# Patient Record
Sex: FEMALE | Race: WHITE
[De-identification: names, ages, dates, MRNs, and addresses within clinical notes are randomized per-mention and may not be internally consistent; named-entity substitution may affect disease eponyms.]

---

## 2022-07-05 NOTE — NUR
CALLED [facility] FOR PANEL CALL, DR. MIX PAGED AND ON PHONE WITH DR. WEI.

## 2022-07-06 NOTE — NUR
Patient is alert to self, speaks little English, able to answer simple questions. Is COVID 
positive, no SOB or coughing noted. S/P dialysis this early morning, no adverse reactions 
noted. AV shunt to left upper arm. Peripheral IV to right FA, patent and intact. Patient 
needs assistance with feeding, poor appetite noted. All needs attended, call light within 
reach.

## 2022-07-06 NOTE — NUR
Spoke to supervisor Lorena to follow up regarding dialysis. Per Lorena pt 
has to be inpatient to have dialysis done. Informed Dr. Mccord and will admit 
patient. Epic panel call placed, spoke to Mikki, she stated she will get a hold 
of Dr. Hill for admitting.

## 2022-07-06 NOTE — NUR
Pt. admitted to [facility] surgical unit Room 318, under care of Dr. Hill. Belongs 
List completed.

## 2022-07-06 NOTE — NUR
Admitted patient in Med Surg  floor under the care of Dr Hill, Patient alert 2/3 unclear if 
she speak English cause does not response to verbal stimuli, Patient has dialysis shunt on 
left upper arm, no bleeding noted, dialysis staff will do dialysis asap, patient BP 
elevated, MD aware. Patient positive for Covid 19, on droplet caution, on isolation, no s/s 
of sob no chest pain, no s/s of pain, sat wnl in 96% room air. Patient has right and left 
groin redness, patient has wet diaper, cont to monitor.

## 2022-07-06 NOTE — NUR
Telephone call from Dialysis RN Yogi and stated that pt. need to be inpatient 
to have dialysis done. Informed Supervisor Lorena and spoke to MARLON Calix.

## 2022-07-07 NOTE — NUR
Patient received care well throughout shift with no signs of distress or discomfort.  
Patient tolerating feedings, but when medication is crushed.  Will endorse to PM nurse for 
the need of swallow evaluation.  IV site patent and intact.  Patient originally scheduled to 
have dialysis performed today but will have dialysis performed tomorrow.  Bed left in lowest 
position with call light within reach.  Comfort measures provided.  Will endorse information 
to PM nurse.

## 2022-07-07 NOTE — NUR
Awake alert and oriented x1-2 Calm and cooperative. VSS. Needs attended.

COVID(+) All due meds given without difficulty. Incontinent of both bowel and 

bladder. Kept clean and dry. Patient on dialysis Tues-Thurs-Sat Left arm AV 

shunt intact. Oligur ic. Incontinent of bowel and bladder. BM noted this shift. 

Fall precautions maintained.Siderails up for safety.                                         
                                                                                            
.

## 2022-11-17 ENCOUNTER — HOSPITAL ENCOUNTER (INPATIENT)
Dept: HOSPITAL 12 - ER | Age: 87
LOS: 8 days | Discharge: NURSING, REHAB OR LONG-TERM CARE | DRG: 871 | End: 2022-11-25
Admitting: INTERNAL MEDICINE
Payer: MEDICARE

## 2022-11-17 VITALS — BODY MASS INDEX: 19.31 KG/M2 | WEIGHT: 109 LBS | HEIGHT: 63 IN

## 2022-11-17 DIAGNOSIS — M24.562: ICD-10-CM

## 2022-11-17 DIAGNOSIS — M24.561: ICD-10-CM

## 2022-11-17 DIAGNOSIS — Z20.822: ICD-10-CM

## 2022-11-17 DIAGNOSIS — R62.7: ICD-10-CM

## 2022-11-17 DIAGNOSIS — E87.20: ICD-10-CM

## 2022-11-17 DIAGNOSIS — L89.896: ICD-10-CM

## 2022-11-17 DIAGNOSIS — E87.6: ICD-10-CM

## 2022-11-17 DIAGNOSIS — E11.22: ICD-10-CM

## 2022-11-17 DIAGNOSIS — E11.65: ICD-10-CM

## 2022-11-17 DIAGNOSIS — D68.59: ICD-10-CM

## 2022-11-17 DIAGNOSIS — J44.0: ICD-10-CM

## 2022-11-17 DIAGNOSIS — I35.0: ICD-10-CM

## 2022-11-17 DIAGNOSIS — Z66: ICD-10-CM

## 2022-11-17 DIAGNOSIS — B96.20: ICD-10-CM

## 2022-11-17 DIAGNOSIS — Z99.2: ICD-10-CM

## 2022-11-17 DIAGNOSIS — E43: ICD-10-CM

## 2022-11-17 DIAGNOSIS — N39.0: ICD-10-CM

## 2022-11-17 DIAGNOSIS — Z79.4: ICD-10-CM

## 2022-11-17 DIAGNOSIS — Z16.12: ICD-10-CM

## 2022-11-17 DIAGNOSIS — J69.0: ICD-10-CM

## 2022-11-17 DIAGNOSIS — N25.0: ICD-10-CM

## 2022-11-17 DIAGNOSIS — Z79.82: ICD-10-CM

## 2022-11-17 DIAGNOSIS — N18.6: ICD-10-CM

## 2022-11-17 DIAGNOSIS — Z22.322: ICD-10-CM

## 2022-11-17 DIAGNOSIS — G92.8: ICD-10-CM

## 2022-11-17 DIAGNOSIS — A41.9: Primary | ICD-10-CM

## 2022-11-17 DIAGNOSIS — I12.0: ICD-10-CM

## 2022-11-17 DIAGNOSIS — R65.20: ICD-10-CM

## 2022-11-17 DIAGNOSIS — E78.5: ICD-10-CM

## 2022-11-17 DIAGNOSIS — F03.90: ICD-10-CM

## 2022-11-17 DIAGNOSIS — J96.21: ICD-10-CM

## 2022-11-17 DIAGNOSIS — I45.10: ICD-10-CM

## 2022-11-17 DIAGNOSIS — Z74.09: ICD-10-CM

## 2022-11-17 LAB
ALP SERPL-CCNC: 178 U/L (ref 50–136)
ALT SERPL W/O P-5'-P-CCNC: 48 U/L (ref 14–59)
AST SERPL-CCNC: 80 U/L (ref 15–37)
BASE EXCESS BLDA CALC-SCNC: 1.3 MMOL/L
BASE EXCESS BLDA CALC-SCNC: 5.9 MMOL/L
BILIRUB DIRECT SERPL-MCNC: 0.2 MG/DL (ref 0–0.2)
BILIRUB SERPL-MCNC: 0.5 MG/DL (ref 0.2–1)
BUN SERPL-MCNC: 38 MG/DL (ref 7–18)
CHLORIDE SERPL-SCNC: 96 MMOL/L (ref 98–107)
CO2 SERPL-SCNC: 29 MMOL/L (ref 21–32)
CREAT SERPL-MCNC: 1.5 MG/DL (ref 0.6–1.3)
GLUCOSE SERPL-MCNC: 389 MG/DL (ref 74–106)
HCO3 BLDA-SCNC: 24.5 MMOL/L
HCO3 BLDA-SCNC: 29.2 MMOL/L
HCT VFR BLD AUTO: 39.4 % (ref 31.2–41.9)
HGB BLDA OXIMETRY-MCNC: 12.8 G/DL (ref 12–16)
HGB BLDA OXIMETRY-MCNC: 13.3 G/DL (ref 12–16)
INHALED O2 CONCENTRATION: 100 %
INHALED O2 CONCENTRATION: 100 %
INHALED O2 FLOW RATE: 15 L/MIN (ref 0–30)
INHALED O2 FLOW RATE: 40 L/MIN (ref 0–30)
MCH RBC QN AUTO: 29.3 UUG (ref 24.7–32.8)
MCV RBC AUTO: 93.3 FL (ref 75.5–95.3)
PCO2 TEMP ADJ BLDA: 34.3 MMHG (ref 35–45)
PCO2 TEMP ADJ BLDA: 37.5 MMHG (ref 35–45)
PH TEMP ADJ BLDA: 7.47 [PH] (ref 7.35–7.45)
PH TEMP ADJ BLDA: 7.51 [PH] (ref 7.35–7.45)
PLATELET # BLD AUTO: 357 K/UL (ref 179–408)
PO2 TEMP ADJ BLDA: 280.3 MMHG (ref 75–100)
PO2 TEMP ADJ BLDA: 50.8 MMHG (ref 75–100)
POTASSIUM SERPL-SCNC: 3 MMOL/L (ref 3.5–5.1)
SPECIMEN DRAWN FROM PATIENT: (no result)
SPECIMEN DRAWN FROM PATIENT: (no result)
VENTILATION MODE VENT: (no result)
WS STN SPEC: 6.9 G/DL (ref 6.4–8.2)

## 2022-11-17 PROCEDURE — A6213 FOAM DRG >16<=48 SQ IN W/BDR: HCPCS

## 2022-11-17 PROCEDURE — A6209 FOAM DRSG <=16 SQ IN W/O BDR: HCPCS

## 2022-11-17 PROCEDURE — U0003 INFECTIOUS AGENT DETECTION BY NUCLEIC ACID (DNA OR RNA); SEVERE ACUTE RESPIRATORY SYNDROME CORONAVIRUS 2 (SARS-COV-2) (CORONAVIRUS DISEASE [COVID-19]), AMPLIFIED PROBE TECHNIQUE, MAKING USE OF HIGH THROUGHPUT TECHNOLOGIES AS DESCRIBED BY CMS-2020-01-R: HCPCS

## 2022-11-17 PROCEDURE — G0378 HOSPITAL OBSERVATION PER HR: HCPCS

## 2022-11-17 PROCEDURE — A4663 DIALYSIS BLOOD PRESSURE CUFF: HCPCS

## 2022-11-17 RX ADMIN — DONEPEZIL HYDROCHLORIDE SCH MG: 5 TABLET, FILM COATED ORAL at 21:00

## 2022-11-17 NOTE — NUR
It is unsafe for the patient to taken PO meds (Aricept 5 mg and Lipitor 20 mg). 
Patient may aspirate.

## 2022-11-17 NOTE — NUR
Yessy (daughter) left for the night and would like to be notified when 
patient is moved to TELE unit. 



Phone #: [phone]

## 2022-11-18 VITALS — SYSTOLIC BLOOD PRESSURE: 125 MMHG | DIASTOLIC BLOOD PRESSURE: 64 MMHG

## 2022-11-18 LAB
ALP SERPL-CCNC: 141 U/L (ref 50–136)
ALT SERPL W/O P-5'-P-CCNC: 27 U/L (ref 14–59)
AST SERPL-CCNC: 28 U/L (ref 15–37)
BILIRUB SERPL-MCNC: 0.5 MG/DL (ref 0.2–1)
BUN SERPL-MCNC: 48 MG/DL (ref 7–18)
CHLORIDE SERPL-SCNC: 100 MMOL/L (ref 98–107)
CHOLEST SERPL-MCNC: 114 MG/DL (ref ?–200)
CO2 SERPL-SCNC: 28 MMOL/L (ref 21–32)
CREAT SERPL-MCNC: 2 MG/DL (ref 0.6–1.3)
GLUCOSE SERPL-MCNC: 149 MG/DL (ref 74–106)
HCT VFR BLD AUTO: 33.4 % (ref 31.2–41.9)
HDLC SERPL-MCNC: 41 MG/DL (ref 40–60)
MAGNESIUM SERPL-MCNC: 1.9 MG/DL (ref 1.8–2.4)
MCH RBC QN AUTO: 30.1 UUG (ref 24.7–32.8)
MCV RBC AUTO: 91.4 FL (ref 75.5–95.3)
PHOSPHATE SERPL-MCNC: 1.4 MG/DL (ref 2.5–4.9)
PLATELET # BLD AUTO: 262 K/UL (ref 179–408)
POTASSIUM SERPL-SCNC: 3.7 MMOL/L (ref 3.5–5.1)
TRIGL SERPL-MCNC: 68 MG/DL (ref 30–150)
TSH SERPL DL<=0.005 MIU/L-ACNC: 0.63 MIU/ML (ref 0.36–3.74)
WS STN SPEC: 5.7 G/DL (ref 6.4–8.2)

## 2022-11-18 RX ADMIN — Medication SCH EACH: at 21:43

## 2022-11-18 RX ADMIN — SODIUM CHLORIDE PRN UNIT: 9 INJECTION, SOLUTION INTRAVENOUS at 01:07

## 2022-11-18 RX ADMIN — DONEPEZIL HYDROCHLORIDE SCH MG: 5 TABLET, FILM COATED ORAL at 21:46

## 2022-11-18 RX ADMIN — ESCITALOPRAM OXALATE SCH MG: 10 TABLET, FILM COATED ORAL at 09:00

## 2022-11-18 RX ADMIN — Medication SCH EACH: at 02:15

## 2022-11-18 RX ADMIN — PIPERACILLIN SODIUM AND TAZOBACTAM SODIUM SCH MLS/HR: .375; 3 INJECTION, POWDER, LYOPHILIZED, FOR SOLUTION INTRAVENOUS at 09:56

## 2022-11-18 RX ADMIN — DOCUSATE SODIUM SCH MG: 100 CAPSULE, LIQUID FILLED ORAL at 21:46

## 2022-11-18 RX ADMIN — ASPIRIN SCH MG: 81 TABLET, CHEWABLE ORAL at 09:00

## 2022-11-18 RX ADMIN — DOCUSATE SODIUM SCH MG: 100 CAPSULE, LIQUID FILLED ORAL at 09:00

## 2022-11-18 RX ADMIN — PIPERACILLIN SODIUM AND TAZOBACTAM SODIUM SCH MLS/HR: .375; 3 INJECTION, POWDER, LYOPHILIZED, FOR SOLUTION INTRAVENOUS at 21:48

## 2022-11-18 RX ADMIN — Medication SCH EACH: at 12:00

## 2022-11-18 RX ADMIN — SODIUM CHLORIDE PRN UNIT: 9 INJECTION, SOLUTION INTRAVENOUS at 21:44

## 2022-11-18 RX ADMIN — PANTOPRAZOLE SODIUM SCH MG: 40 TABLET, DELAYED RELEASE ORAL at 07:00

## 2022-11-18 RX ADMIN — SODIUM CHLORIDE PRN UNIT: 9 INJECTION, SOLUTION INTRAVENOUS at 02:19

## 2022-11-18 RX ADMIN — Medication SCH EACH: at 16:44

## 2022-11-18 RX ADMIN — Medication SCH EACH: at 00:57

## 2022-11-18 NOTE — NUR
Funmi cleaning perform. Diaper fully saturated with brown diarrhea. Bright red 
blood seen coming out of anus once cleaned was finished. Dr. Perez made 
aware.

## 2022-11-19 VITALS — SYSTOLIC BLOOD PRESSURE: 138 MMHG | DIASTOLIC BLOOD PRESSURE: 37 MMHG

## 2022-11-19 VITALS — SYSTOLIC BLOOD PRESSURE: 117 MMHG | DIASTOLIC BLOOD PRESSURE: 58 MMHG

## 2022-11-19 VITALS — DIASTOLIC BLOOD PRESSURE: 67 MMHG | SYSTOLIC BLOOD PRESSURE: 153 MMHG

## 2022-11-19 VITALS — SYSTOLIC BLOOD PRESSURE: 127 MMHG | DIASTOLIC BLOOD PRESSURE: 34 MMHG

## 2022-11-19 LAB
APPEARANCE UR: (no result)
BASE EXCESS BLDA CALC-SCNC: -0.8 MMOL/L
BILIRUB UR QL STRIP: NEGATIVE
BUN SERPL-MCNC: 57 MG/DL (ref 7–18)
CHLORIDE SERPL-SCNC: 100 MMOL/L (ref 98–107)
CO2 SERPL-SCNC: 27 MMOL/L (ref 21–32)
COLOR UR: YELLOW
CREAT SERPL-MCNC: 2.5 MG/DL (ref 0.6–1.3)
GLUCOSE SERPL-MCNC: 168 MG/DL (ref 74–106)
GLUCOSE UR STRIP-MCNC: NEGATIVE MG/DL
HCO3 BLDA-SCNC: 22.4 MMOL/L
HCT VFR BLD AUTO: 33.7 % (ref 31.2–41.9)
HGB BLDA OXIMETRY-MCNC: 11.1 G/DL (ref 12–16)
HGB UR QL STRIP: (no result)
INHALED O2 CONCENTRATION: 30 %
KETONES UR STRIP-MCNC: NEGATIVE MG/DL
LEUKOCYTE ESTERASE UR QL STRIP: (no result)
MAGNESIUM SERPL-MCNC: 2 MG/DL (ref 1.8–2.4)
MCH RBC QN AUTO: 29.5 UUG (ref 24.7–32.8)
MCV RBC AUTO: 92.1 FL (ref 75.5–95.3)
NITRITE UR QL STRIP: NEGATIVE
PCO2 TEMP ADJ BLDA: 31.9 MMHG (ref 35–45)
PH TEMP ADJ BLDA: 7.46 [PH] (ref 7.35–7.45)
PH UR STRIP: 7 [PH] (ref 5–8)
PHOSPHATE SERPL-MCNC: 4 MG/DL (ref 2.5–4.9)
PLATELET # BLD AUTO: 225 K/UL (ref 179–408)
PO2 TEMP ADJ BLDA: 111.4 MMHG (ref 75–100)
POTASSIUM SERPL-SCNC: 4.3 MMOL/L (ref 3.5–5.1)
SP GR UR STRIP: 1.02 (ref 1–1.03)
SPECIMEN DRAWN FROM PATIENT: (no result)
UROBILINOGEN UR STRIP-MCNC: 0.2 E.U./DL
VENTILATION MODE VENT: (no result)

## 2022-11-19 PROCEDURE — 05H533Z INSERTION OF INFUSION DEVICE INTO RIGHT SUBCLAVIAN VEIN, PERCUTANEOUS APPROACH: ICD-10-PCS

## 2022-11-19 PROCEDURE — B546ZZA ULTRASONOGRAPHY OF RIGHT SUBCLAVIAN VEIN, GUIDANCE: ICD-10-PCS

## 2022-11-19 RX ADMIN — DOCUSATE SODIUM SCH MG: 100 CAPSULE, LIQUID FILLED ORAL at 11:04

## 2022-11-19 RX ADMIN — ESCITALOPRAM OXALATE SCH MG: 10 TABLET, FILM COATED ORAL at 09:03

## 2022-11-19 RX ADMIN — Medication SCH EACH: at 11:48

## 2022-11-19 RX ADMIN — DOCUSATE SODIUM SCH MG: 100 CAPSULE, LIQUID FILLED ORAL at 16:48

## 2022-11-19 RX ADMIN — Medication SCH EACH: at 16:48

## 2022-11-19 RX ADMIN — Medication SCH EACH: at 06:23

## 2022-11-19 RX ADMIN — ESCITALOPRAM OXALATE SCH MG: 10 TABLET, FILM COATED ORAL at 09:00

## 2022-11-19 RX ADMIN — DEXTROSE AND SODIUM CHLORIDE PRN MLS/HR: 5; .45 INJECTION, SOLUTION INTRAVENOUS at 22:14

## 2022-11-19 RX ADMIN — PIPERACILLIN SODIUM AND TAZOBACTAM SODIUM SCH MLS/HR: .375; 3 INJECTION, POWDER, LYOPHILIZED, FOR SOLUTION INTRAVENOUS at 22:02

## 2022-11-19 RX ADMIN — SODIUM CHLORIDE PRN UNIT: 9 INJECTION, SOLUTION INTRAVENOUS at 11:50

## 2022-11-19 RX ADMIN — PANTOPRAZOLE SODIUM SCH MG: 40 TABLET, DELAYED RELEASE ORAL at 06:10

## 2022-11-19 RX ADMIN — ESCITALOPRAM OXALATE SCH MG: 10 TABLET, FILM COATED ORAL at 11:03

## 2022-11-19 RX ADMIN — DOCUSATE SODIUM SCH MG: 100 CAPSULE, LIQUID FILLED ORAL at 09:00

## 2022-11-19 RX ADMIN — ASPIRIN SCH MG: 81 TABLET, CHEWABLE ORAL at 09:00

## 2022-11-19 RX ADMIN — PIPERACILLIN SODIUM AND TAZOBACTAM SODIUM SCH MLS/HR: .375; 3 INJECTION, POWDER, LYOPHILIZED, FOR SOLUTION INTRAVENOUS at 10:45

## 2022-11-19 RX ADMIN — ASPIRIN SCH MG: 81 TABLET, CHEWABLE ORAL at 09:03

## 2022-11-19 RX ADMIN — DONEPEZIL HYDROCHLORIDE SCH MG: 5 TABLET, FILM COATED ORAL at 21:00

## 2022-11-19 RX ADMIN — ASPIRIN SCH MG: 81 TABLET, CHEWABLE ORAL at 11:01

## 2022-11-19 RX ADMIN — DOCUSATE SODIUM SCH MG: 100 CAPSULE, LIQUID FILLED ORAL at 09:03

## 2022-11-19 RX ADMIN — Medication SCH EACH: at 21:54

## 2022-11-19 NOTE — NUR
IV SITE NOTED TO RIGHT FA LEAKING. UNABLE TO RESTART. NOTIFIED RN CHARGE. WILL ORDER 
MID-LINE PER MD. RN SUPERVISOR NOTIFIED.

## 2022-11-19 NOTE — NUR
Patient unable to swallow pills due to her being sleepy and she is unable to follow commands 
when offered food or medications. Patient is currently NPO

## 2022-11-19 NOTE — NUR
Celis #16fr inserted safely via sterile procedure Drainage pus like 400 mls Specimen sent 
for UA/C&S.

## 2022-11-19 NOTE — NUR
RECEIVED PT IN BED, SLEEPING. ATTEMPTED TO GIVE HER BREAKFAST AND MORNING MEDICATIONS BUT 
PATIENT IS NON-AROUSABLE, MOANING OCCASIONALLY BUT DOESN'T OPEN HER EYES. DOCTOR IS 
INFORMED. NPO FOR NOW PER MD.

## 2022-11-19 NOTE — NUR
PATIENT ON CONT HIGH FLOW @ 40%, TITRATE FIO2 TO 30%, SAT 98%, ABG IN AM BEFORE 0600, PT 
STABLE .JOSESITO ZUÑIGAP

-------------------------------------------------------------------------------

Addendum: 11/19/22 at 0342 by VALDEZ LE RT

-------------------------------------------------------------------------------

Amended: Links added.

## 2022-11-20 VITALS — DIASTOLIC BLOOD PRESSURE: 52 MMHG | SYSTOLIC BLOOD PRESSURE: 101 MMHG

## 2022-11-20 VITALS — SYSTOLIC BLOOD PRESSURE: 141 MMHG | DIASTOLIC BLOOD PRESSURE: 51 MMHG

## 2022-11-20 VITALS — DIASTOLIC BLOOD PRESSURE: 68 MMHG | SYSTOLIC BLOOD PRESSURE: 113 MMHG

## 2022-11-20 VITALS — DIASTOLIC BLOOD PRESSURE: 59 MMHG | SYSTOLIC BLOOD PRESSURE: 139 MMHG

## 2022-11-20 VITALS — DIASTOLIC BLOOD PRESSURE: 62 MMHG | SYSTOLIC BLOOD PRESSURE: 124 MMHG

## 2022-11-20 LAB
ALP SERPL-CCNC: 137 U/L (ref 50–136)
ALT SERPL W/O P-5'-P-CCNC: 23 U/L (ref 14–59)
AMORPH URATE CRY URNS QL MICRO: (no result) /HPF
AST SERPL-CCNC: 19 U/L (ref 15–37)
BILIRUB SERPL-MCNC: 0.5 MG/DL (ref 0.2–1)
BUN SERPL-MCNC: 59 MG/DL (ref 7–18)
CHLORIDE SERPL-SCNC: 101 MMOL/L (ref 98–107)
CO2 SERPL-SCNC: 25 MMOL/L (ref 21–32)
CREAT SERPL-MCNC: 3.1 MG/DL (ref 0.6–1.3)
DEPRECATED SQUAMOUS URNS QL MICRO: (no result) /HPF
GLUCOSE SERPL-MCNC: 187 MG/DL (ref 74–106)
HCT VFR BLD AUTO: 28.2 % (ref 31.2–41.9)
MAGNESIUM SERPL-MCNC: 1.9 MG/DL (ref 1.8–2.4)
MCH RBC QN AUTO: 29.8 UUG (ref 24.7–32.8)
MCV RBC AUTO: 92.1 FL (ref 75.5–95.3)
PHOSPHATE SERPL-MCNC: 4 MG/DL (ref 2.5–4.9)
PLATELET # BLD AUTO: 208 K/UL (ref 179–408)
POTASSIUM SERPL-SCNC: 3.4 MMOL/L (ref 3.5–5.1)
WBC #/AREA URNS HPF: (no result) /HPF
WS STN SPEC: 4.9 G/DL (ref 6.4–8.2)

## 2022-11-20 RX ADMIN — ESCITALOPRAM OXALATE SCH MG: 10 TABLET, FILM COATED ORAL at 09:00

## 2022-11-20 RX ADMIN — Medication SCH EACH: at 12:07

## 2022-11-20 RX ADMIN — Medication SCH EACH: at 21:00

## 2022-11-20 RX ADMIN — DOCUSATE SODIUM SCH MG: 100 CAPSULE, LIQUID FILLED ORAL at 09:00

## 2022-11-20 RX ADMIN — ASPIRIN SCH MG: 81 TABLET, CHEWABLE ORAL at 09:00

## 2022-11-20 RX ADMIN — SODIUM CHLORIDE PRN UNIT: 9 INJECTION, SOLUTION INTRAVENOUS at 22:42

## 2022-11-20 RX ADMIN — PIPERACILLIN SODIUM AND TAZOBACTAM SODIUM SCH MLS/HR: .25; 2 INJECTION, POWDER, LYOPHILIZED, FOR SOLUTION INTRAVENOUS at 23:50

## 2022-11-20 RX ADMIN — Medication SCH EACH: at 07:40

## 2022-11-20 RX ADMIN — PIPERACILLIN SODIUM AND TAZOBACTAM SODIUM SCH MLS/HR: .25; 2 INJECTION, POWDER, LYOPHILIZED, FOR SOLUTION INTRAVENOUS at 16:52

## 2022-11-20 RX ADMIN — MUPIROCIN SCH GM: 20 OINTMENT TOPICAL at 20:56

## 2022-11-20 RX ADMIN — SODIUM CHLORIDE PRN UNIT: 9 INJECTION, SOLUTION INTRAVENOUS at 07:49

## 2022-11-20 RX ADMIN — MUPIROCIN SCH GM: 20 OINTMENT TOPICAL at 09:57

## 2022-11-20 RX ADMIN — DONEPEZIL HYDROCHLORIDE SCH MG: 5 TABLET, FILM COATED ORAL at 20:53

## 2022-11-20 RX ADMIN — Medication SCH EACH: at 16:51

## 2022-11-20 RX ADMIN — PIPERACILLIN SODIUM AND TAZOBACTAM SODIUM SCH MLS/HR: .25; 2 INJECTION, POWDER, LYOPHILIZED, FOR SOLUTION INTRAVENOUS at 08:34

## 2022-11-20 RX ADMIN — DOCUSATE SODIUM SCH MG: 100 CAPSULE, LIQUID FILLED ORAL at 16:51

## 2022-11-20 NOTE — NUR
Received pt sleeping in bed, morning meds were not given since patient is NPO and unable to 
swallow food and meds.

## 2022-11-21 VITALS — DIASTOLIC BLOOD PRESSURE: 59 MMHG | SYSTOLIC BLOOD PRESSURE: 132 MMHG

## 2022-11-21 VITALS — DIASTOLIC BLOOD PRESSURE: 34 MMHG | SYSTOLIC BLOOD PRESSURE: 112 MMHG

## 2022-11-21 VITALS — SYSTOLIC BLOOD PRESSURE: 129 MMHG | DIASTOLIC BLOOD PRESSURE: 42 MMHG

## 2022-11-21 VITALS — DIASTOLIC BLOOD PRESSURE: 84 MMHG | SYSTOLIC BLOOD PRESSURE: 103 MMHG

## 2022-11-21 VITALS — SYSTOLIC BLOOD PRESSURE: 132 MMHG | DIASTOLIC BLOOD PRESSURE: 58 MMHG

## 2022-11-21 LAB
ALP SERPL-CCNC: 125 U/L (ref 50–136)
ALT SERPL W/O P-5'-P-CCNC: 21 U/L (ref 14–59)
AST SERPL-CCNC: 23 U/L (ref 15–37)
BILIRUB SERPL-MCNC: 0.4 MG/DL (ref 0.2–1)
BUN SERPL-MCNC: 58 MG/DL (ref 7–18)
CHLORIDE SERPL-SCNC: 101 MMOL/L (ref 98–107)
CO2 SERPL-SCNC: 23 MMOL/L (ref 21–32)
CREAT SERPL-MCNC: 3.5 MG/DL (ref 0.6–1.3)
GLUCOSE SERPL-MCNC: 204 MG/DL (ref 74–106)
HCT VFR BLD AUTO: 28.8 % (ref 31.2–41.9)
MAGNESIUM SERPL-MCNC: 1.8 MG/DL (ref 1.8–2.4)
MCH RBC QN AUTO: 30.3 UUG (ref 24.7–32.8)
MCV RBC AUTO: 90.4 FL (ref 75.5–95.3)
PHOSPHATE SERPL-MCNC: 4.3 MG/DL (ref 2.5–4.9)
PLATELET # BLD AUTO: 204 K/UL (ref 179–408)
POTASSIUM SERPL-SCNC: 3.2 MMOL/L (ref 3.5–5.1)
WS STN SPEC: 4.9 G/DL (ref 6.4–8.2)

## 2022-11-21 PROCEDURE — 5A1D70Z PERFORMANCE OF URINARY FILTRATION, INTERMITTENT, LESS THAN 6 HOURS PER DAY: ICD-10-PCS | Performed by: INTERNAL MEDICINE

## 2022-11-21 RX ADMIN — POTASSIUM CHLORIDE SCH MLS/HR: 14.9 INJECTION, SOLUTION INTRAVENOUS at 10:00

## 2022-11-21 RX ADMIN — MUPIROCIN SCH GM: 20 OINTMENT TOPICAL at 10:04

## 2022-11-21 RX ADMIN — Medication SCH EACH: at 06:44

## 2022-11-21 RX ADMIN — DEXTROSE AND SODIUM CHLORIDE PRN MLS/HR: 5; .45 INJECTION, SOLUTION INTRAVENOUS at 10:04

## 2022-11-21 RX ADMIN — ESCITALOPRAM OXALATE SCH MG: 10 TABLET, FILM COATED ORAL at 08:14

## 2022-11-21 RX ADMIN — DONEPEZIL HYDROCHLORIDE SCH MG: 5 TABLET, FILM COATED ORAL at 21:39

## 2022-11-21 RX ADMIN — ASPIRIN SCH MG: 81 TABLET, CHEWABLE ORAL at 08:13

## 2022-11-21 RX ADMIN — PIPERACILLIN SODIUM AND TAZOBACTAM SODIUM SCH MLS/HR: .25; 2 INJECTION, POWDER, LYOPHILIZED, FOR SOLUTION INTRAVENOUS at 23:18

## 2022-11-21 RX ADMIN — MUPIROCIN SCH GM: 20 OINTMENT TOPICAL at 21:39

## 2022-11-21 RX ADMIN — Medication SCH EACH: at 21:40

## 2022-11-21 RX ADMIN — SODIUM CHLORIDE PRN UNIT: 9 INJECTION, SOLUTION INTRAVENOUS at 07:48

## 2022-11-21 RX ADMIN — PIPERACILLIN SODIUM AND TAZOBACTAM SODIUM SCH MLS/HR: .25; 2 INJECTION, POWDER, LYOPHILIZED, FOR SOLUTION INTRAVENOUS at 15:45

## 2022-11-21 RX ADMIN — Medication SCH EACH: at 11:14

## 2022-11-21 RX ADMIN — PIPERACILLIN SODIUM AND TAZOBACTAM SODIUM SCH MLS/HR: .25; 2 INJECTION, POWDER, LYOPHILIZED, FOR SOLUTION INTRAVENOUS at 07:41

## 2022-11-21 RX ADMIN — DOCUSATE SODIUM SCH MG: 100 CAPSULE, LIQUID FILLED ORAL at 16:11

## 2022-11-21 RX ADMIN — DOCUSATE SODIUM SCH MG: 100 CAPSULE, LIQUID FILLED ORAL at 08:13

## 2022-11-21 RX ADMIN — POTASSIUM CHLORIDE SCH MLS/HR: 14.9 INJECTION, SOLUTION INTRAVENOUS at 14:52

## 2022-11-21 RX ADMIN — Medication SCH EACH: at 16:10

## 2022-11-22 VITALS — SYSTOLIC BLOOD PRESSURE: 138 MMHG | DIASTOLIC BLOOD PRESSURE: 51 MMHG

## 2022-11-22 VITALS — SYSTOLIC BLOOD PRESSURE: 126 MMHG | DIASTOLIC BLOOD PRESSURE: 53 MMHG

## 2022-11-22 VITALS — SYSTOLIC BLOOD PRESSURE: 188 MMHG | DIASTOLIC BLOOD PRESSURE: 99 MMHG

## 2022-11-22 VITALS — SYSTOLIC BLOOD PRESSURE: 90 MMHG | DIASTOLIC BLOOD PRESSURE: 39 MMHG

## 2022-11-22 VITALS — DIASTOLIC BLOOD PRESSURE: 56 MMHG | SYSTOLIC BLOOD PRESSURE: 127 MMHG

## 2022-11-22 VITALS — SYSTOLIC BLOOD PRESSURE: 152 MMHG | DIASTOLIC BLOOD PRESSURE: 78 MMHG

## 2022-11-22 LAB
ALP SERPL-CCNC: 115 U/L (ref 50–136)
ALT SERPL W/O P-5'-P-CCNC: 16 U/L (ref 14–59)
AST SERPL-CCNC: 23 U/L (ref 15–37)
BILIRUB SERPL-MCNC: 0.4 MG/DL (ref 0.2–1)
BUN SERPL-MCNC: < 1 MG/DL (ref 7–18)
CHLORIDE SERPL-SCNC: 100 MMOL/L (ref 98–107)
CO2 SERPL-SCNC: 31 MMOL/L (ref 21–32)
CREAT SERPL-MCNC: 1.9 MG/DL (ref 0.6–1.3)
GLUCOSE SERPL-MCNC: 190 MG/DL (ref 74–106)
HCT VFR BLD AUTO: 28.2 % (ref 31.2–41.9)
MAGNESIUM SERPL-MCNC: 1.4 MG/DL (ref 1.8–2.4)
MCH RBC QN AUTO: 30.7 UUG (ref 24.7–32.8)
MCV RBC AUTO: 91.6 FL (ref 75.5–95.3)
PHOSPHATE SERPL-MCNC: 2.6 MG/DL (ref 2.5–4.9)
PLATELET # BLD AUTO: 196 K/UL (ref 179–408)
POTASSIUM SERPL-SCNC: 3 MMOL/L (ref 3.5–5.1)
WS STN SPEC: 4.6 G/DL (ref 6.4–8.2)

## 2022-11-22 RX ADMIN — MAGNESIUM SULFATE IN DEXTROSE SCH MLS/HR: 10 INJECTION, SOLUTION INTRAVENOUS at 10:01

## 2022-11-22 RX ADMIN — DOCUSATE SODIUM SCH MG: 50 LIQUID ORAL at 16:38

## 2022-11-22 RX ADMIN — MUPIROCIN SCH GM: 20 OINTMENT TOPICAL at 08:05

## 2022-11-22 RX ADMIN — ESCITALOPRAM OXALATE SCH MG: 10 TABLET, FILM COATED ORAL at 08:09

## 2022-11-22 RX ADMIN — ASPIRIN SCH MG: 81 TABLET, CHEWABLE ORAL at 08:09

## 2022-11-22 RX ADMIN — Medication SCH EACH: at 11:54

## 2022-11-22 RX ADMIN — SODIUM CHLORIDE PRN UNIT: 9 INJECTION, SOLUTION INTRAVENOUS at 16:40

## 2022-11-22 RX ADMIN — Medication SCH EACH: at 16:19

## 2022-11-22 RX ADMIN — PIPERACILLIN SODIUM AND TAZOBACTAM SODIUM SCH MLS/HR: .25; 2 INJECTION, POWDER, LYOPHILIZED, FOR SOLUTION INTRAVENOUS at 08:05

## 2022-11-22 RX ADMIN — NIFEDIPINE SCH MG: 30 TABLET, FILM COATED, EXTENDED RELEASE ORAL at 21:00

## 2022-11-22 RX ADMIN — Medication SCH EACH: at 21:28

## 2022-11-22 RX ADMIN — POTASSIUM CHLORIDE SCH MLS/HR: 14.9 INJECTION, SOLUTION INTRAVENOUS at 11:15

## 2022-11-22 RX ADMIN — SODIUM CHLORIDE PRN UNIT: 9 INJECTION, SOLUTION INTRAVENOUS at 08:00

## 2022-11-22 RX ADMIN — MAGNESIUM SULFATE IN DEXTROSE SCH MLS/HR: 10 INJECTION, SOLUTION INTRAVENOUS at 10:58

## 2022-11-22 RX ADMIN — POTASSIUM CHLORIDE SCH MLS/HR: 14.9 INJECTION, SOLUTION INTRAVENOUS at 13:19

## 2022-11-22 RX ADMIN — DEXTROSE AND SODIUM CHLORIDE PRN MLS/HR: 5; .45 INJECTION, SOLUTION INTRAVENOUS at 02:08

## 2022-11-22 RX ADMIN — SODIUM CHLORIDE PRN UNIT: 9 INJECTION, SOLUTION INTRAVENOUS at 21:34

## 2022-11-22 RX ADMIN — DONEPEZIL HYDROCHLORIDE SCH MG: 5 TABLET, FILM COATED ORAL at 21:28

## 2022-11-22 RX ADMIN — POTASSIUM CHLORIDE SCH MLS/HR: 14.9 INJECTION, SOLUTION INTRAVENOUS at 10:17

## 2022-11-22 RX ADMIN — PIPERACILLIN SODIUM AND TAZOBACTAM SODIUM SCH MLS/HR: .25; 2 INJECTION, POWDER, LYOPHILIZED, FOR SOLUTION INTRAVENOUS at 15:47

## 2022-11-22 RX ADMIN — Medication SCH EACH: at 07:05

## 2022-11-22 RX ADMIN — DOCUSATE SODIUM SCH MG: 100 CAPSULE, LIQUID FILLED ORAL at 08:09

## 2022-11-22 RX ADMIN — MUPIROCIN SCH GM: 20 OINTMENT TOPICAL at 21:28

## 2022-11-22 NOTE — NUR
patient passed the swallow eval per speech therapist, Dali, patient started on puree diet, 
yang le NP made aware. aspirations precautions are in place.

## 2022-11-22 NOTE — NUR
WOUND CARE CONSULT: PT PRESENTS WITH MULTIPLE DRY FOOT/LOWER LEG WOUNDS, PRESENT ON 
ADMISSION. PT HAS SEVERELY CONTRACTED LOWER EXTREMITIES. PT IS CACHECTIC. DPM CONSULT CALLED 
TO DR GUZMAN. PT ALSO NOTED TO HAVE VERY BONY AREAS AND AREAS OF SKIN DISCOLORATION. 
RECOMMENDATIONS MADE FOR SKIN PROTECTION. DISCUSSED WITH NURSING STAFF. PT IS ON FIRST STEP 
CIRRUS LOW AIRLOSS MATTRESS. PT IS NPO DUE TO FAILING SWALLOW EVAL. PER RN. DIETARY CONSULT 
IN PLACE. PT NOTED TO HAVE VERY FRAGILE SKIN. MD IN AGREEMENT WITH PLAN OF CARE.

## 2022-11-23 VITALS — SYSTOLIC BLOOD PRESSURE: 117 MMHG | DIASTOLIC BLOOD PRESSURE: 42 MMHG

## 2022-11-23 VITALS — DIASTOLIC BLOOD PRESSURE: 87 MMHG | SYSTOLIC BLOOD PRESSURE: 128 MMHG

## 2022-11-23 VITALS — DIASTOLIC BLOOD PRESSURE: 111 MMHG | SYSTOLIC BLOOD PRESSURE: 134 MMHG

## 2022-11-23 VITALS — SYSTOLIC BLOOD PRESSURE: 167 MMHG | DIASTOLIC BLOOD PRESSURE: 62 MMHG

## 2022-11-23 VITALS — DIASTOLIC BLOOD PRESSURE: 77 MMHG | SYSTOLIC BLOOD PRESSURE: 145 MMHG

## 2022-11-23 LAB
BUN SERPL-MCNC: 12 MG/DL (ref 7–18)
CHLORIDE SERPL-SCNC: 98 MMOL/L (ref 98–107)
CO2 SERPL-SCNC: 25 MMOL/L (ref 21–32)
CREAT SERPL-MCNC: 2.5 MG/DL (ref 0.6–1.3)
GLUCOSE SERPL-MCNC: 89 MG/DL (ref 74–106)
HCT VFR BLD AUTO: 37.6 % (ref 31.2–41.9)
MAGNESIUM SERPL-MCNC: 2.3 MG/DL (ref 1.8–2.4)
MCH RBC QN AUTO: 29.8 UUG (ref 24.7–32.8)
MCV RBC AUTO: 91.8 FL (ref 75.5–95.3)
PHOSPHATE SERPL-MCNC: 3.2 MG/DL (ref 2.5–4.9)
PLATELET # BLD AUTO: 229 K/UL (ref 179–408)
POTASSIUM SERPL-SCNC: 3.6 MMOL/L (ref 3.5–5.1)

## 2022-11-23 RX ADMIN — ESCITALOPRAM OXALATE SCH MG: 10 TABLET, FILM COATED ORAL at 09:00

## 2022-11-23 RX ADMIN — NIFEDIPINE SCH MG: 30 TABLET, FILM COATED, EXTENDED RELEASE ORAL at 21:32

## 2022-11-23 RX ADMIN — PIPERACILLIN SODIUM AND TAZOBACTAM SODIUM SCH MLS/HR: .25; 2 INJECTION, POWDER, LYOPHILIZED, FOR SOLUTION INTRAVENOUS at 00:21

## 2022-11-23 RX ADMIN — Medication SCH EACH: at 16:43

## 2022-11-23 RX ADMIN — Medication SCH EACH: at 06:33

## 2022-11-23 RX ADMIN — MUPIROCIN SCH GM: 20 OINTMENT TOPICAL at 21:33

## 2022-11-23 RX ADMIN — DOCUSATE SODIUM SCH MG: 50 LIQUID ORAL at 09:00

## 2022-11-23 RX ADMIN — Medication SCH EACH: at 21:14

## 2022-11-23 RX ADMIN — DEXTROSE AND SODIUM CHLORIDE PRN MLS/HR: 5; .45 INJECTION, SOLUTION INTRAVENOUS at 22:00

## 2022-11-23 RX ADMIN — Medication SCH ML: at 17:00

## 2022-11-23 RX ADMIN — SODIUM CHLORIDE PRN UNIT: 9 INJECTION, SOLUTION INTRAVENOUS at 06:34

## 2022-11-23 RX ADMIN — Medication SCH EACH: at 11:53

## 2022-11-23 RX ADMIN — MEGESTROL ACETATE SCH MG: 20 TABLET ORAL at 21:32

## 2022-11-23 RX ADMIN — MEGESTROL ACETATE SCH MG: 20 TABLET ORAL at 18:15

## 2022-11-23 RX ADMIN — DONEPEZIL HYDROCHLORIDE SCH MG: 5 TABLET, FILM COATED ORAL at 21:32

## 2022-11-23 RX ADMIN — Medication SCH ML: at 13:00

## 2022-11-23 RX ADMIN — ASPIRIN SCH MG: 81 TABLET, CHEWABLE ORAL at 09:00

## 2022-11-23 RX ADMIN — MUPIROCIN SCH GM: 20 OINTMENT TOPICAL at 09:00

## 2022-11-23 RX ADMIN — PIPERACILLIN SODIUM AND TAZOBACTAM SODIUM SCH MLS/HR: .25; 2 INJECTION, POWDER, LYOPHILIZED, FOR SOLUTION INTRAVENOUS at 16:09

## 2022-11-23 RX ADMIN — PIPERACILLIN SODIUM AND TAZOBACTAM SODIUM SCH MLS/HR: .25; 2 INJECTION, POWDER, LYOPHILIZED, FOR SOLUTION INTRAVENOUS at 08:00

## 2022-11-23 RX ADMIN — DOCUSATE SODIUM SCH MG: 50 LIQUID ORAL at 17:00

## 2022-11-23 RX ADMIN — SODIUM CHLORIDE PRN UNIT: 9 INJECTION, SOLUTION INTRAVENOUS at 21:36

## 2022-11-23 RX ADMIN — NIFEDIPINE SCH MG: 30 TABLET, FILM COATED, EXTENDED RELEASE ORAL at 21:00

## 2022-11-23 RX ADMIN — DONEPEZIL HYDROCHLORIDE SCH MG: 5 TABLET, FILM COATED ORAL at 21:00

## 2022-11-23 RX ADMIN — SODIUM CHLORIDE PRN UNIT: 9 INJECTION, SOLUTION INTRAVENOUS at 16:45

## 2022-11-23 NOTE — NUR
PT B/P /62GAVE 10MG OF APRESOLINE IV NO SIGNS OF RESPIRATORY DISTRESS NOTEDwILL 
ENDORSE TO AM NURSE.

## 2022-11-23 NOTE — NUR
SHIFT NOTE:   PT SLEPT MOST OF NIGHT AWAKENEDTO GIVE MEDICATION AND TAKE HS BLOOD SUGAR 
WHICH  GAVE 6 UNITS OF REGULAR INSULIN NO SIGNS OF DIABETIC REACTION NOTED.  PT AM 
BLOOD SUGAR 75 NO COVERAGE REQUIRED NO SIGNS OF DIABETIC REACTION NOTED.

## 2022-11-24 VITALS — DIASTOLIC BLOOD PRESSURE: 35 MMHG | SYSTOLIC BLOOD PRESSURE: 112 MMHG

## 2022-11-24 VITALS — DIASTOLIC BLOOD PRESSURE: 45 MMHG | SYSTOLIC BLOOD PRESSURE: 137 MMHG

## 2022-11-24 VITALS — DIASTOLIC BLOOD PRESSURE: 48 MMHG | SYSTOLIC BLOOD PRESSURE: 161 MMHG

## 2022-11-24 VITALS — DIASTOLIC BLOOD PRESSURE: 50 MMHG | SYSTOLIC BLOOD PRESSURE: 106 MMHG

## 2022-11-24 VITALS — SYSTOLIC BLOOD PRESSURE: 151 MMHG | DIASTOLIC BLOOD PRESSURE: 54 MMHG

## 2022-11-24 LAB
BASE EXCESS BLDA CALC-SCNC: 2.8 MMOL/L
BUN SERPL-MCNC: 40 MG/DL (ref 7–18)
CHLORIDE SERPL-SCNC: 99 MMOL/L (ref 98–107)
CO2 SERPL-SCNC: 27 MMOL/L (ref 21–32)
CREAT SERPL-MCNC: 2.7 MG/DL (ref 0.6–1.3)
GLUCOSE SERPL-MCNC: 212 MG/DL (ref 74–106)
HCO3 BLDA-SCNC: 26.2 MMOL/L
HCT VFR BLD AUTO: 30.7 % (ref 31.2–41.9)
HGB BLDA OXIMETRY-MCNC: 10.7 G/DL (ref 12–16)
INHALED O2 CONCENTRATION: 28 %
MAGNESIUM SERPL-MCNC: 2.2 MG/DL (ref 1.8–2.4)
MCH RBC QN AUTO: 30.1 UUG (ref 24.7–32.8)
MCV RBC AUTO: 91.1 FL (ref 75.5–95.3)
PCO2 TEMP ADJ BLDA: 35.7 MMHG (ref 35–45)
PH TEMP ADJ BLDA: 7.48 [PH] (ref 7.35–7.45)
PHOSPHATE SERPL-MCNC: 4.4 MG/DL (ref 2.5–4.9)
PLATELET # BLD AUTO: 237 K/UL (ref 179–408)
PO2 TEMP ADJ BLDA: 87.7 MMHG (ref 75–100)
POTASSIUM SERPL-SCNC: 3.3 MMOL/L (ref 3.5–5.1)
SPECIMEN DRAWN FROM PATIENT: (no result)
VENTILATION MODE VENT: (no result)

## 2022-11-24 RX ADMIN — DEXTROSE AND SODIUM CHLORIDE PRN MLS/HR: 5; .45 INJECTION, SOLUTION INTRAVENOUS at 11:22

## 2022-11-24 RX ADMIN — MEGESTROL ACETATE SCH MG: 20 TABLET ORAL at 09:00

## 2022-11-24 RX ADMIN — DOCUSATE SODIUM SCH MG: 50 LIQUID ORAL at 17:00

## 2022-11-24 RX ADMIN — ASPIRIN SCH MG: 81 TABLET, CHEWABLE ORAL at 09:00

## 2022-11-24 RX ADMIN — Medication SCH EACH: at 12:29

## 2022-11-24 RX ADMIN — Medication SCH EACH: at 20:57

## 2022-11-24 RX ADMIN — DONEPEZIL HYDROCHLORIDE SCH MG: 5 TABLET, FILM COATED ORAL at 20:37

## 2022-11-24 RX ADMIN — Medication SCH ML: at 12:51

## 2022-11-24 RX ADMIN — Medication SCH ML: at 17:27

## 2022-11-24 RX ADMIN — Medication SCH EACH: at 06:49

## 2022-11-24 RX ADMIN — MUPIROCIN SCH GM: 20 OINTMENT TOPICAL at 11:25

## 2022-11-24 RX ADMIN — Medication SCH EACH: at 17:26

## 2022-11-24 RX ADMIN — MEGESTROL ACETATE SCH MG: 20 TABLET ORAL at 17:00

## 2022-11-24 RX ADMIN — MUPIROCIN SCH GM: 20 OINTMENT TOPICAL at 20:37

## 2022-11-24 RX ADMIN — SODIUM CHLORIDE PRN UNIT: 9 INJECTION, SOLUTION INTRAVENOUS at 21:00

## 2022-11-24 RX ADMIN — SODIUM CHLORIDE PRN UNIT: 9 INJECTION, SOLUTION INTRAVENOUS at 08:17

## 2022-11-24 RX ADMIN — Medication SCH ML: at 09:00

## 2022-11-24 RX ADMIN — NIFEDIPINE SCH MG: 30 TABLET, FILM COATED, EXTENDED RELEASE ORAL at 20:37

## 2022-11-24 RX ADMIN — ESCITALOPRAM OXALATE SCH MG: 10 TABLET, FILM COATED ORAL at 09:00

## 2022-11-24 RX ADMIN — DOCUSATE SODIUM SCH MG: 50 LIQUID ORAL at 09:00

## 2022-11-24 NOTE — NUR
RECEIVED PATIENT IN BED AWAKE NON VERBAL AT THIS TIME WITH HI FLOW O2 AT 6L/M WITH FIO2 AT 
28 PERCENT WITH NO S/S OF SHORTNESS OF BREATH AT THIS TIME.REMAIN ON IVF AS ORDERED WITH NO 
S/S OF INFILTERATION AT THIS TIME LEFT UPPER ARM DIALYSIS ACCESS INTACT WITH POSITIVE BRUIT 
AND THRILL REMAIN ON CONTACT ISOLATION FOR POSITIVE ESBL IN URINE MAX ASSIST FOR ALL ADL 
REPOSITIONED FOR COMFORT DIALYSIS RN HERE AND STARTED DIALYSIS AS ORDERED.

## 2022-11-24 NOTE — NUR
PATIENT CONTINUES TO REFUSE HER MEDICATIONS AND TO EAT THE NP ODETTE AWARE WITH NO NEW 
ORDERS AT THIS TIME.

## 2022-11-24 NOTE — NUR
DIALYSIS COMPLETED AS ORDERED AND ONE LITER REMOVED AND TOLERATED WELL WILL CONTINUE TO 
OBSERVE O2 CHANGED TO 3L/M BY NASAL CANULA BY THE RESPIRATORY THERAPIST.

## 2022-11-25 VITALS — DIASTOLIC BLOOD PRESSURE: 54 MMHG | SYSTOLIC BLOOD PRESSURE: 157 MMHG

## 2022-11-25 VITALS — DIASTOLIC BLOOD PRESSURE: 53 MMHG | SYSTOLIC BLOOD PRESSURE: 150 MMHG

## 2022-11-25 VITALS — DIASTOLIC BLOOD PRESSURE: 63 MMHG | SYSTOLIC BLOOD PRESSURE: 131 MMHG

## 2022-11-25 RX ADMIN — Medication SCH EACH: at 12:12

## 2022-11-25 RX ADMIN — ESCITALOPRAM OXALATE SCH MG: 10 TABLET, FILM COATED ORAL at 09:11

## 2022-11-25 RX ADMIN — Medication SCH ML: at 09:13

## 2022-11-25 RX ADMIN — ASPIRIN SCH MG: 81 TABLET, CHEWABLE ORAL at 09:11

## 2022-11-25 RX ADMIN — MUPIROCIN SCH GM: 20 OINTMENT TOPICAL at 09:13

## 2022-11-25 RX ADMIN — Medication SCH ML: at 13:00

## 2022-11-25 RX ADMIN — MEGESTROL ACETATE SCH MG: 20 TABLET ORAL at 09:12

## 2022-11-25 RX ADMIN — SODIUM CHLORIDE PRN UNIT: 9 INJECTION, SOLUTION INTRAVENOUS at 12:13

## 2022-11-25 RX ADMIN — DEXTROSE AND SODIUM CHLORIDE PRN MLS/HR: 5; .45 INJECTION, SOLUTION INTRAVENOUS at 03:10

## 2022-11-25 RX ADMIN — DOCUSATE SODIUM SCH MG: 50 LIQUID ORAL at 09:12

## 2022-11-25 RX ADMIN — Medication SCH EACH: at 06:58

## 2022-11-25 NOTE — NUR
DISCHARGE ORDER NOTED PATIENT WILL BE DISCHARGED TO [facility] AND PER 
THE  WAS UNABLE TO REACH PATIETS SON BY=UT STATED IT WAS OKAY TO SEND PATIENT.

## 2022-11-25 NOTE — NUR
CALLED [facility] SPOKE WITH FABIENNE AND REPORT GIVEN FOR CONTINUING 
CARE.PATIENT WILL BE DISCHARGED WITH [facility] PER ODETTE TEMPLE THE MEDICAL 
PROVIDER.

## 2022-11-25 NOTE — NUR
PATIENT DISCHARGED PICKED UP BY [facility] IN SAME CONDITION APPETITE REMAINS POOR 
REMAIN ON ISOLATION FOR ESBL IN URINE NOT IN DISTRESS AT THIS TIME.

## 2022-12-23 ENCOUNTER — HOSPITAL ENCOUNTER (INPATIENT)
Dept: HOSPITAL 54 - ER | Age: 87
LOS: 7 days | DRG: 871 | End: 2022-12-30
Attending: NURSE PRACTITIONER | Admitting: NURSE PRACTITIONER
Payer: MEDICARE

## 2022-12-23 VITALS — WEIGHT: 90 LBS | BODY MASS INDEX: 15.36 KG/M2 | HEIGHT: 64 IN

## 2022-12-23 DIAGNOSIS — K21.9: ICD-10-CM

## 2022-12-23 DIAGNOSIS — I46.9: ICD-10-CM

## 2022-12-23 DIAGNOSIS — E78.5: ICD-10-CM

## 2022-12-23 DIAGNOSIS — B96.89: ICD-10-CM

## 2022-12-23 DIAGNOSIS — Z20.822: ICD-10-CM

## 2022-12-23 DIAGNOSIS — E88.09: ICD-10-CM

## 2022-12-23 DIAGNOSIS — F03.93: ICD-10-CM

## 2022-12-23 DIAGNOSIS — E11.22: ICD-10-CM

## 2022-12-23 DIAGNOSIS — J96.01: ICD-10-CM

## 2022-12-23 DIAGNOSIS — Z66: ICD-10-CM

## 2022-12-23 DIAGNOSIS — E27.9: ICD-10-CM

## 2022-12-23 DIAGNOSIS — N39.0: ICD-10-CM

## 2022-12-23 DIAGNOSIS — R64: ICD-10-CM

## 2022-12-23 DIAGNOSIS — Z51.5: ICD-10-CM

## 2022-12-23 DIAGNOSIS — N18.6: ICD-10-CM

## 2022-12-23 DIAGNOSIS — J69.0: ICD-10-CM

## 2022-12-23 DIAGNOSIS — I12.0: ICD-10-CM

## 2022-12-23 DIAGNOSIS — R65.21: ICD-10-CM

## 2022-12-23 DIAGNOSIS — D64.9: ICD-10-CM

## 2022-12-23 DIAGNOSIS — K52.9: ICD-10-CM

## 2022-12-23 DIAGNOSIS — E43: ICD-10-CM

## 2022-12-23 DIAGNOSIS — J98.11: ICD-10-CM

## 2022-12-23 DIAGNOSIS — D69.6: ICD-10-CM

## 2022-12-23 DIAGNOSIS — A41.9: Primary | ICD-10-CM

## 2022-12-23 DIAGNOSIS — Z99.2: ICD-10-CM

## 2022-12-23 DIAGNOSIS — E87.6: ICD-10-CM

## 2022-12-23 DIAGNOSIS — J98.09: ICD-10-CM

## 2022-12-23 PROCEDURE — G0378 HOSPITAL OBSERVATION PER HR: HCPCS

## 2022-12-23 PROCEDURE — C9113 INJ PANTOPRAZOLE SODIUM, VIA: HCPCS

## 2022-12-23 PROCEDURE — C9803 HOPD COVID-19 SPEC COLLECT: HCPCS

## 2022-12-23 NOTE — NUR
BARBER FINNEGAN FROM [facility] C/O SOB. PATIENT TAKEN TO ER 
BED 5, ER MD AT BEDSIDE. RT AT BEDSIDE.

## 2022-12-24 VITALS — DIASTOLIC BLOOD PRESSURE: 55 MMHG | SYSTOLIC BLOOD PRESSURE: 123 MMHG

## 2022-12-24 VITALS — SYSTOLIC BLOOD PRESSURE: 109 MMHG | DIASTOLIC BLOOD PRESSURE: 45 MMHG

## 2022-12-24 VITALS — DIASTOLIC BLOOD PRESSURE: 67 MMHG | SYSTOLIC BLOOD PRESSURE: 107 MMHG

## 2022-12-24 VITALS — DIASTOLIC BLOOD PRESSURE: 54 MMHG | SYSTOLIC BLOOD PRESSURE: 117 MMHG

## 2022-12-24 VITALS — SYSTOLIC BLOOD PRESSURE: 110 MMHG | DIASTOLIC BLOOD PRESSURE: 68 MMHG

## 2022-12-24 VITALS — SYSTOLIC BLOOD PRESSURE: 137 MMHG | DIASTOLIC BLOOD PRESSURE: 76 MMHG

## 2022-12-24 VITALS — DIASTOLIC BLOOD PRESSURE: 58 MMHG | SYSTOLIC BLOOD PRESSURE: 106 MMHG

## 2022-12-24 VITALS — SYSTOLIC BLOOD PRESSURE: 98 MMHG | DIASTOLIC BLOOD PRESSURE: 57 MMHG

## 2022-12-24 VITALS — SYSTOLIC BLOOD PRESSURE: 100 MMHG | DIASTOLIC BLOOD PRESSURE: 52 MMHG

## 2022-12-24 VITALS — SYSTOLIC BLOOD PRESSURE: 130 MMHG | DIASTOLIC BLOOD PRESSURE: 58 MMHG

## 2022-12-24 VITALS — SYSTOLIC BLOOD PRESSURE: 130 MMHG | DIASTOLIC BLOOD PRESSURE: 90 MMHG

## 2022-12-24 VITALS — DIASTOLIC BLOOD PRESSURE: 41 MMHG | SYSTOLIC BLOOD PRESSURE: 103 MMHG

## 2022-12-24 VITALS — DIASTOLIC BLOOD PRESSURE: 41 MMHG | SYSTOLIC BLOOD PRESSURE: 105 MMHG

## 2022-12-24 VITALS — SYSTOLIC BLOOD PRESSURE: 103 MMHG | DIASTOLIC BLOOD PRESSURE: 63 MMHG

## 2022-12-24 VITALS — SYSTOLIC BLOOD PRESSURE: 116 MMHG | DIASTOLIC BLOOD PRESSURE: 71 MMHG

## 2022-12-24 VITALS — DIASTOLIC BLOOD PRESSURE: 80 MMHG | SYSTOLIC BLOOD PRESSURE: 96 MMHG

## 2022-12-24 VITALS — DIASTOLIC BLOOD PRESSURE: 51 MMHG | SYSTOLIC BLOOD PRESSURE: 135 MMHG

## 2022-12-24 VITALS — DIASTOLIC BLOOD PRESSURE: 57 MMHG | SYSTOLIC BLOOD PRESSURE: 99 MMHG

## 2022-12-24 VITALS — SYSTOLIC BLOOD PRESSURE: 73 MMHG | DIASTOLIC BLOOD PRESSURE: 15 MMHG

## 2022-12-24 VITALS — DIASTOLIC BLOOD PRESSURE: 64 MMHG | SYSTOLIC BLOOD PRESSURE: 122 MMHG

## 2022-12-24 VITALS — DIASTOLIC BLOOD PRESSURE: 87 MMHG | SYSTOLIC BLOOD PRESSURE: 139 MMHG

## 2022-12-24 VITALS — SYSTOLIC BLOOD PRESSURE: 117 MMHG | DIASTOLIC BLOOD PRESSURE: 61 MMHG

## 2022-12-24 VITALS — SYSTOLIC BLOOD PRESSURE: 179 MMHG | DIASTOLIC BLOOD PRESSURE: 86 MMHG

## 2022-12-24 VITALS — DIASTOLIC BLOOD PRESSURE: 72 MMHG | SYSTOLIC BLOOD PRESSURE: 103 MMHG

## 2022-12-24 VITALS — DIASTOLIC BLOOD PRESSURE: 91 MMHG | SYSTOLIC BLOOD PRESSURE: 109 MMHG

## 2022-12-24 LAB
ALBUMIN SERPL BCP-MCNC: 1.5 G/DL (ref 3.4–5)
ALP SERPL-CCNC: 121 U/L (ref 46–116)
ALT SERPL W P-5'-P-CCNC: 16 U/L (ref 12–78)
AST SERPL W P-5'-P-CCNC: 30 U/L (ref 15–37)
BASE EXCESS BLDA CALC-SCNC: -2 MMOL/L
BASE EXCESS BLDA CALC-SCNC: -2.6 MMOL/L
BASE EXCESS BLDA CALC-SCNC: -8.1 MMOL/L
BASOPHILS # BLD AUTO: 0 K/UL (ref 0–0.2)
BASOPHILS # BLD AUTO: 0 K/UL (ref 0–0.2)
BASOPHILS NFR BLD AUTO: 0.1 % (ref 0–2)
BASOPHILS NFR BLD AUTO: 0.2 % (ref 0–2)
BILIRUB DIRECT SERPL-MCNC: 0.2 MG/DL (ref 0–0.2)
BILIRUB SERPL-MCNC: 0.4 MG/DL (ref 0.2–1)
BILIRUB UR QL STRIP: NEGATIVE
BUN SERPL-MCNC: 33 MG/DL (ref 7–18)
BUN SERPL-MCNC: 36 MG/DL (ref 7–18)
CALCIUM SERPL-MCNC: 7.5 MG/DL (ref 8.5–10.1)
CALCIUM SERPL-MCNC: 7.6 MG/DL (ref 8.5–10.1)
CHLORIDE SERPL-SCNC: 102 MMOL/L (ref 98–107)
CHLORIDE SERPL-SCNC: 105 MMOL/L (ref 98–107)
CHOLEST SERPL-MCNC: 133 MG/DL (ref ?–200)
CO2 SERPL-SCNC: 19 MMOL/L (ref 21–32)
CO2 SERPL-SCNC: 21 MMOL/L (ref 21–32)
COLOR UR: YELLOW
CREAT SERPL-MCNC: 2 MG/DL (ref 0.6–1.3)
CREAT SERPL-MCNC: 2.4 MG/DL (ref 0.6–1.3)
DO-HGB MFR BLDA: 363.8 MMHG
DO-HGB MFR BLDA: 587.4 MMHG
EOSINOPHIL NFR BLD AUTO: 0 % (ref 0–6)
EOSINOPHIL NFR BLD AUTO: 0 % (ref 0–6)
GLUCOSE SERPL-MCNC: 217 MG/DL (ref 74–106)
GLUCOSE SERPL-MCNC: 289 MG/DL (ref 74–106)
GLUCOSE UR STRIP-MCNC: NEGATIVE MG/DL
HCT VFR BLD AUTO: 27 % (ref 33–45)
HCT VFR BLD AUTO: 30 % (ref 33–45)
HDLC SERPL-MCNC: 28 MG/DL (ref 40–60)
HGB BLD-MCNC: 8.5 G/DL (ref 11.5–14.8)
HGB BLD-MCNC: 9.3 G/DL (ref 11.5–14.8)
INHALED O2 CONCENTRATION: 100 %
INHALED O2 CONCENTRATION: 100 %
INHALED O2 CONCENTRATION: 80 %
LDLC SERPL DIRECT ASSAY-MCNC: 84 MG/DL (ref 0–99)
LEUKOCYTE ESTERASE UR QL STRIP: (no result)
LYMPHOCYTES NFR BLD AUTO: 0.2 K/UL (ref 0.8–4.8)
LYMPHOCYTES NFR BLD AUTO: 12.9 % (ref 20–44)
LYMPHOCYTES NFR BLD AUTO: 2.6 K/UL (ref 0.8–4.8)
LYMPHOCYTES NFR BLD AUTO: 3.4 % (ref 20–44)
MAGNESIUM SERPL-MCNC: 1.8 MG/DL (ref 1.8–2.4)
MCHC RBC AUTO-ENTMCNC: 31 G/DL (ref 31–36)
MCHC RBC AUTO-ENTMCNC: 31 G/DL (ref 31–36)
MCV RBC AUTO: 93 FL (ref 82–100)
MCV RBC AUTO: 93 FL (ref 82–100)
MONOCYTES NFR BLD AUTO: 0.2 K/UL (ref 0.1–1.3)
MONOCYTES NFR BLD AUTO: 0.3 K/UL (ref 0.1–1.3)
MONOCYTES NFR BLD AUTO: 1.8 % (ref 2–12)
MONOCYTES NFR BLD AUTO: 2.4 % (ref 2–12)
NEUTROPHILS # BLD AUTO: 16.9 K/UL (ref 1.8–8.9)
NEUTROPHILS # BLD AUTO: 6.3 K/UL (ref 1.8–8.9)
NEUTROPHILS NFR BLD AUTO: 85.2 % (ref 43–81)
NEUTROPHILS NFR BLD AUTO: 94 % (ref 43–81)
NITRITE UR QL STRIP: NEGATIVE
PCO2 TEMP ADJ BLDA: 25.2 MMHG (ref 35–45)
PCO2 TEMP ADJ BLDA: 33.3 MMHG (ref 35–45)
PCO2 TEMP ADJ BLDA: 35.7 MMHG (ref 35–45)
PH TEMP ADJ BLDA: 7.31 [PH] (ref 7.35–7.45)
PH TEMP ADJ BLDA: 7.42 [PH] (ref 7.35–7.45)
PH TEMP ADJ BLDA: 7.51 [PH] (ref 7.35–7.45)
PH UR STRIP: 8.5 [PH] (ref 5–8)
PHOSPHATE SERPL-MCNC: 1.2 MG/DL (ref 2.5–4.9)
PLATELET # BLD AUTO: 225 K/UL (ref 150–450)
PLATELET # BLD AUTO: 337 K/UL (ref 150–450)
PO2 TEMP ADJ BLDA: 180.1 MMHG (ref 75–100)
PO2 TEMP ADJ BLDA: 79.8 MMHG (ref 75–100)
PO2 TEMP ADJ BLDA: 92.3 MMHG (ref 75–100)
POTASSIUM SERPL-SCNC: 2.6 MMOL/L (ref 3.5–5.1)
POTASSIUM SERPL-SCNC: 3.4 MMOL/L (ref 3.5–5.1)
PROT SERPL-MCNC: 4.9 G/DL (ref 6.4–8.2)
PROT UR QL STRIP: (no result) MG/DL
RBC # BLD AUTO: 2.92 MIL/UL (ref 4–5.2)
RBC # BLD AUTO: 3.18 MIL/UL (ref 4–5.2)
RBC #/AREA URNS HPF: (no result) /HPF (ref 0–2)
SAO2 % BLDA: 96.5 % (ref 92–98.5)
SAO2 % BLDA: 98.9 % (ref 92–98.5)
SODIUM SERPL-SCNC: 138 MMOL/L (ref 136–145)
SODIUM SERPL-SCNC: 138 MMOL/L (ref 136–145)
TRIGL SERPL-MCNC: 94 MG/DL (ref 30–150)
UROBILINOGEN UR STRIP-MCNC: 0.2 EU/DL
VENTILATION MODE VENT: (no result)
WBC #/AREA URNS HPF: (no result) /HPF (ref 0–3)
WBC NRBC COR # BLD AUTO: 19.8 K/UL (ref 4.3–11)
WBC NRBC COR # BLD AUTO: 6.7 K/UL (ref 4.3–11)

## 2022-12-24 PROCEDURE — 5A1945Z RESPIRATORY VENTILATION, 24-96 CONSECUTIVE HOURS: ICD-10-PCS | Performed by: INTERNAL MEDICINE

## 2022-12-24 PROCEDURE — 0BH18EZ INSERTION OF ENDOTRACHEAL AIRWAY INTO TRACHEA, VIA NATURAL OR ARTIFICIAL OPENING ENDOSCOPIC: ICD-10-PCS

## 2022-12-24 PROCEDURE — 05HB33Z INSERTION OF INFUSION DEVICE INTO RIGHT BASILIC VEIN, PERCUTANEOUS APPROACH: ICD-10-PCS | Performed by: NURSE PRACTITIONER

## 2022-12-24 RX ADMIN — SODIUM CHLORIDE PRN MLS/HR: 9 INJECTION, SOLUTION INTRAVENOUS at 20:15

## 2022-12-24 RX ADMIN — POTASSIUM CHLORIDE SCH MLS/HR: 200 INJECTION, SOLUTION INTRAVENOUS at 03:15

## 2022-12-24 RX ADMIN — SODIUM CHLORIDE SCH MG: 9 INJECTION, SOLUTION INTRAVENOUS at 08:33

## 2022-12-24 RX ADMIN — POTASSIUM CHLORIDE SCH MLS/HR: 200 INJECTION, SOLUTION INTRAVENOUS at 04:25

## 2022-12-24 RX ADMIN — POTASSIUM CHLORIDE SCH MLS/HR: 200 INJECTION, SOLUTION INTRAVENOUS at 05:01

## 2022-12-24 RX ADMIN — POTASSIUM CHLORIDE SCH MLS/HR: 200 INJECTION, SOLUTION INTRAVENOUS at 06:05

## 2022-12-24 RX ADMIN — SODIUM CHLORIDE PRN MLS/HR: 9 INJECTION, SOLUTION INTRAVENOUS at 23:50

## 2022-12-24 RX ADMIN — PROPOFOL PRN MLS/HR: 10 INJECTION, EMULSION INTRAVENOUS at 20:13

## 2022-12-24 RX ADMIN — PIPERACILLIN SODIUM AND TAZOBACTAM SODIUM SCH MLS/HR: .25; 2 INJECTION, POWDER, LYOPHILIZED, FOR SOLUTION INTRAVENOUS at 07:58

## 2022-12-24 RX ADMIN — PROPOFOL PRN MLS/HR: 10 INJECTION, EMULSION INTRAVENOUS at 05:25

## 2022-12-24 RX ADMIN — SODIUM CHLORIDE SCH MG: 9 INJECTION, SOLUTION INTRAVENOUS at 05:23

## 2022-12-24 RX ADMIN — PIPERACILLIN SODIUM AND TAZOBACTAM SODIUM SCH MLS/HR: .25; 2 INJECTION, POWDER, LYOPHILIZED, FOR SOLUTION INTRAVENOUS at 23:14

## 2022-12-24 RX ADMIN — PIPERACILLIN SODIUM AND TAZOBACTAM SODIUM SCH MLS/HR: .25; 2 INJECTION, POWDER, LYOPHILIZED, FOR SOLUTION INTRAVENOUS at 16:03

## 2022-12-24 RX ADMIN — SODIUM CHLORIDE PRN MLS/HR: 9 INJECTION, SOLUTION INTRAVENOUS at 05:26

## 2022-12-24 RX ADMIN — SODIUM CHLORIDE SCH MG: 9 INJECTION, SOLUTION INTRAVENOUS at 20:11

## 2022-12-24 NOTE — NUR
RN NOTE



BP STILL LOW AT 73/15 AFTER BOLUS OF  CC, ON CALL LUYDMILA MADE AWARE, SHE THE ORDERED 
TO START LEVOPHED. ORDER TAKEN AND CARRIED OUT. WILL CONT TO MONITOR PT.

## 2022-12-24 NOTE — NUR
RN ICU

RECEIVED PT SEDATED ON DIPRIVAN DRIP.  ETT INTACT, POSITION CHECKED BY XRAY.  NGT CONNECTED 
TO LIS DRAINING SMALL AMOUNT CLEAR BROWNISH GASTRIC CONTENTS.  CLOUDY URINE SEEN IN FUNES 
BAG.  BAG CHANGED TO URINEMETER FOR I&O.  MIDLINE INSERTED IN RIGHT UPPER ARM.  IV'S 
TRANSFERRED TO MIDLINE.  SUCTIONED.  ORAL CARE DONE.  REPOSITIONED

## 2022-12-24 NOTE — NUR
RN ICU

PT'S SON DUY CAME IN TO SEE PT.  UPDATE GIVEN.  HE REITERATED THAT HE AND HIS FAMILY WOULD 
NOT WANT CPR FOR PT BUT INTUBATION WAS OK.  HE SAID THAT PT USUALLY HAS DIALYSIS TUFEI BURNETTE 
SAT.

## 2022-12-24 NOTE — NUR
ICU RN OPENING NOTES



RECEIVED REPORT FROM RN MAYLIN, PATIENT SEDATED. ON MECHANICAL VENTILATOR TOLERATING SETTINGS 
WELL, SR ON BEDSIDE TELEMETRY MONITOR. WITH NG TUBE ATTACHED TO SUCTION. FUNES CATHETER WITH 
0 OUTPUT IN PER AM SHIFT. IV ACCESS ON RIGHT UPPER ARM MIDLINE FLUSHING EASILY WITHOUT 
RESISTANCE RUNNING NS AT 75 ML/HR AND R HAND # 20G RUNNING DIPRIVAN AT 5 MCG/MIN. NOTED WITH 
B SOFT WRIST RESTRAINTS, SAFETY MEASURES IMPLEMENTED, WILL CONTINUE TO MONITOR THROUGHOUT 
THE SHIFT.

## 2022-12-24 NOTE — NUR
RN ICU

SUCTIONED SMALL AMOUNT THICK TAN SECRETIONS FROM ETT.  SPO2 100% CONSITENTLY ON CURRENT VENT 
SETTINGS.  ANURIC.

## 2022-12-24 NOTE — NUR
RECEIVED PT INTUBATED 7.5 ETT SECURED AT 22CM LIP LINE. NO REP DISTRESS NOTED. VENT SETTINGS 
AC 16, 450, 50%, +5. SX'D SMALL AMT OF THICK YELLOW SECRETIONS. VENT ALARMS SET AND AUDIBLE. 
AMBU BAG AT BEDSIDE. CONTINUE TO MONITOR.

-------------------------------------------------------------------------------

Addendum: 12/24/22 at 2012 by MARS GUERRA RT

-------------------------------------------------------------------------------

Amended: Links added.

## 2022-12-24 NOTE — NUR
RT



pt intubated with ett 7.5, 22@gum. vent settings AC 18 500 100%. ett patent and secure. vent 
plugged in to red outlet. alarms on and audible. ambu bag at bedside. small tan secretions 
suctioned via ett. lung sounds clear on left, diminished on right. no sob, no resp distress 
noted.

## 2022-12-24 NOTE — NUR
RT AT PT'S BEDSIDE

-------------------------------------------------------------------------------

Addendum: 12/24/22 at 0053 by JILEEP

-------------------------------------------------------------------------------

RT AT PT'S BEDSIDE FOR ABG

## 2022-12-25 VITALS — DIASTOLIC BLOOD PRESSURE: 44 MMHG | SYSTOLIC BLOOD PRESSURE: 107 MMHG

## 2022-12-25 VITALS — DIASTOLIC BLOOD PRESSURE: 40 MMHG | SYSTOLIC BLOOD PRESSURE: 125 MMHG

## 2022-12-25 VITALS — SYSTOLIC BLOOD PRESSURE: 114 MMHG | DIASTOLIC BLOOD PRESSURE: 42 MMHG

## 2022-12-25 VITALS — SYSTOLIC BLOOD PRESSURE: 94 MMHG | DIASTOLIC BLOOD PRESSURE: 42 MMHG

## 2022-12-25 VITALS — DIASTOLIC BLOOD PRESSURE: 37 MMHG | SYSTOLIC BLOOD PRESSURE: 116 MMHG

## 2022-12-25 VITALS — SYSTOLIC BLOOD PRESSURE: 115 MMHG | DIASTOLIC BLOOD PRESSURE: 38 MMHG

## 2022-12-25 VITALS — DIASTOLIC BLOOD PRESSURE: 34 MMHG | SYSTOLIC BLOOD PRESSURE: 125 MMHG

## 2022-12-25 VITALS — SYSTOLIC BLOOD PRESSURE: 123 MMHG | DIASTOLIC BLOOD PRESSURE: 52 MMHG

## 2022-12-25 VITALS — SYSTOLIC BLOOD PRESSURE: 119 MMHG | DIASTOLIC BLOOD PRESSURE: 54 MMHG

## 2022-12-25 VITALS — DIASTOLIC BLOOD PRESSURE: 79 MMHG | SYSTOLIC BLOOD PRESSURE: 103 MMHG

## 2022-12-25 VITALS — SYSTOLIC BLOOD PRESSURE: 121 MMHG | DIASTOLIC BLOOD PRESSURE: 33 MMHG

## 2022-12-25 VITALS — DIASTOLIC BLOOD PRESSURE: 42 MMHG | SYSTOLIC BLOOD PRESSURE: 87 MMHG

## 2022-12-25 VITALS — DIASTOLIC BLOOD PRESSURE: 34 MMHG | SYSTOLIC BLOOD PRESSURE: 115 MMHG

## 2022-12-25 VITALS — SYSTOLIC BLOOD PRESSURE: 108 MMHG | DIASTOLIC BLOOD PRESSURE: 83 MMHG

## 2022-12-25 VITALS — DIASTOLIC BLOOD PRESSURE: 34 MMHG | SYSTOLIC BLOOD PRESSURE: 114 MMHG

## 2022-12-25 VITALS — DIASTOLIC BLOOD PRESSURE: 33 MMHG | SYSTOLIC BLOOD PRESSURE: 125 MMHG

## 2022-12-25 VITALS — SYSTOLIC BLOOD PRESSURE: 121 MMHG | DIASTOLIC BLOOD PRESSURE: 52 MMHG

## 2022-12-25 VITALS — SYSTOLIC BLOOD PRESSURE: 111 MMHG | DIASTOLIC BLOOD PRESSURE: 41 MMHG

## 2022-12-25 VITALS — DIASTOLIC BLOOD PRESSURE: 36 MMHG | SYSTOLIC BLOOD PRESSURE: 118 MMHG

## 2022-12-25 VITALS — DIASTOLIC BLOOD PRESSURE: 36 MMHG | SYSTOLIC BLOOD PRESSURE: 117 MMHG

## 2022-12-25 VITALS — SYSTOLIC BLOOD PRESSURE: 103 MMHG | DIASTOLIC BLOOD PRESSURE: 43 MMHG

## 2022-12-25 VITALS — DIASTOLIC BLOOD PRESSURE: 51 MMHG | SYSTOLIC BLOOD PRESSURE: 118 MMHG

## 2022-12-25 VITALS — DIASTOLIC BLOOD PRESSURE: 34 MMHG | SYSTOLIC BLOOD PRESSURE: 109 MMHG

## 2022-12-25 VITALS — SYSTOLIC BLOOD PRESSURE: 107 MMHG | DIASTOLIC BLOOD PRESSURE: 36 MMHG

## 2022-12-25 VITALS — DIASTOLIC BLOOD PRESSURE: 43 MMHG | SYSTOLIC BLOOD PRESSURE: 134 MMHG

## 2022-12-25 VITALS — DIASTOLIC BLOOD PRESSURE: 31 MMHG | SYSTOLIC BLOOD PRESSURE: 114 MMHG

## 2022-12-25 VITALS — DIASTOLIC BLOOD PRESSURE: 33 MMHG | SYSTOLIC BLOOD PRESSURE: 115 MMHG

## 2022-12-25 VITALS — DIASTOLIC BLOOD PRESSURE: 49 MMHG | SYSTOLIC BLOOD PRESSURE: 120 MMHG

## 2022-12-25 VITALS — SYSTOLIC BLOOD PRESSURE: 124 MMHG | DIASTOLIC BLOOD PRESSURE: 53 MMHG

## 2022-12-25 VITALS — DIASTOLIC BLOOD PRESSURE: 36 MMHG | SYSTOLIC BLOOD PRESSURE: 120 MMHG

## 2022-12-25 VITALS — SYSTOLIC BLOOD PRESSURE: 151 MMHG | DIASTOLIC BLOOD PRESSURE: 57 MMHG

## 2022-12-25 VITALS — DIASTOLIC BLOOD PRESSURE: 76 MMHG | SYSTOLIC BLOOD PRESSURE: 118 MMHG

## 2022-12-25 VITALS — DIASTOLIC BLOOD PRESSURE: 45 MMHG | SYSTOLIC BLOOD PRESSURE: 115 MMHG

## 2022-12-25 VITALS — SYSTOLIC BLOOD PRESSURE: 113 MMHG | DIASTOLIC BLOOD PRESSURE: 37 MMHG

## 2022-12-25 VITALS — SYSTOLIC BLOOD PRESSURE: 112 MMHG | DIASTOLIC BLOOD PRESSURE: 33 MMHG

## 2022-12-25 VITALS — SYSTOLIC BLOOD PRESSURE: 125 MMHG | DIASTOLIC BLOOD PRESSURE: 42 MMHG

## 2022-12-25 VITALS — SYSTOLIC BLOOD PRESSURE: 114 MMHG | DIASTOLIC BLOOD PRESSURE: 34 MMHG

## 2022-12-25 VITALS — DIASTOLIC BLOOD PRESSURE: 70 MMHG | SYSTOLIC BLOOD PRESSURE: 144 MMHG

## 2022-12-25 VITALS — SYSTOLIC BLOOD PRESSURE: 114 MMHG | DIASTOLIC BLOOD PRESSURE: 26 MMHG

## 2022-12-25 VITALS — DIASTOLIC BLOOD PRESSURE: 42 MMHG | SYSTOLIC BLOOD PRESSURE: 130 MMHG

## 2022-12-25 VITALS — SYSTOLIC BLOOD PRESSURE: 156 MMHG | DIASTOLIC BLOOD PRESSURE: 86 MMHG

## 2022-12-25 VITALS — SYSTOLIC BLOOD PRESSURE: 116 MMHG | DIASTOLIC BLOOD PRESSURE: 33 MMHG

## 2022-12-25 VITALS — SYSTOLIC BLOOD PRESSURE: 90 MMHG | DIASTOLIC BLOOD PRESSURE: 58 MMHG

## 2022-12-25 VITALS — SYSTOLIC BLOOD PRESSURE: 118 MMHG | DIASTOLIC BLOOD PRESSURE: 38 MMHG

## 2022-12-25 VITALS — SYSTOLIC BLOOD PRESSURE: 123 MMHG | DIASTOLIC BLOOD PRESSURE: 31 MMHG

## 2022-12-25 VITALS — DIASTOLIC BLOOD PRESSURE: 39 MMHG | SYSTOLIC BLOOD PRESSURE: 125 MMHG

## 2022-12-25 VITALS — DIASTOLIC BLOOD PRESSURE: 80 MMHG | SYSTOLIC BLOOD PRESSURE: 128 MMHG

## 2022-12-25 VITALS — DIASTOLIC BLOOD PRESSURE: 90 MMHG | SYSTOLIC BLOOD PRESSURE: 108 MMHG

## 2022-12-25 VITALS — DIASTOLIC BLOOD PRESSURE: 41 MMHG | SYSTOLIC BLOOD PRESSURE: 117 MMHG

## 2022-12-25 VITALS — DIASTOLIC BLOOD PRESSURE: 38 MMHG | SYSTOLIC BLOOD PRESSURE: 129 MMHG

## 2022-12-25 VITALS — SYSTOLIC BLOOD PRESSURE: 125 MMHG | DIASTOLIC BLOOD PRESSURE: 35 MMHG

## 2022-12-25 VITALS — DIASTOLIC BLOOD PRESSURE: 35 MMHG | SYSTOLIC BLOOD PRESSURE: 125 MMHG

## 2022-12-25 VITALS — SYSTOLIC BLOOD PRESSURE: 128 MMHG | DIASTOLIC BLOOD PRESSURE: 36 MMHG

## 2022-12-25 VITALS — DIASTOLIC BLOOD PRESSURE: 45 MMHG | SYSTOLIC BLOOD PRESSURE: 129 MMHG

## 2022-12-25 VITALS — DIASTOLIC BLOOD PRESSURE: 35 MMHG | SYSTOLIC BLOOD PRESSURE: 110 MMHG

## 2022-12-25 VITALS — DIASTOLIC BLOOD PRESSURE: 33 MMHG | SYSTOLIC BLOOD PRESSURE: 113 MMHG

## 2022-12-25 VITALS — DIASTOLIC BLOOD PRESSURE: 33 MMHG | SYSTOLIC BLOOD PRESSURE: 117 MMHG

## 2022-12-25 VITALS — SYSTOLIC BLOOD PRESSURE: 110 MMHG | DIASTOLIC BLOOD PRESSURE: 38 MMHG

## 2022-12-25 VITALS — SYSTOLIC BLOOD PRESSURE: 112 MMHG | DIASTOLIC BLOOD PRESSURE: 34 MMHG

## 2022-12-25 LAB
BASE EXCESS BLDA CALC-SCNC: -3.7 MMOL/L
BASOPHILS # BLD AUTO: 0 K/UL (ref 0–0.2)
BASOPHILS NFR BLD AUTO: 0 % (ref 0–2)
BUN SERPL-MCNC: 37 MG/DL (ref 7–18)
CALCIUM SERPL-MCNC: 7.1 MG/DL (ref 8.5–10.1)
CHLORIDE SERPL-SCNC: 108 MMOL/L (ref 98–107)
CO2 SERPL-SCNC: 19 MMOL/L (ref 21–32)
CREAT SERPL-MCNC: 2.2 MG/DL (ref 0.6–1.3)
DO-HGB MFR BLDA: 160.6 MMHG
EOSINOPHIL NFR BLD AUTO: 0.1 % (ref 0–6)
GLUCOSE SERPL-MCNC: 150 MG/DL (ref 74–106)
HCT VFR BLD AUTO: 26 % (ref 33–45)
HGB BLD-MCNC: 8.1 G/DL (ref 11.5–14.8)
INHALED O2 CONCENTRATION: 40 %
LYMPHOCYTES NFR BLD AUTO: 0.9 K/UL (ref 0.8–4.8)
LYMPHOCYTES NFR BLD AUTO: 5.6 % (ref 20–44)
MAGNESIUM SERPL-MCNC: 1.6 MG/DL (ref 1.8–2.4)
MCHC RBC AUTO-ENTMCNC: 32 G/DL (ref 31–36)
MCV RBC AUTO: 92 FL (ref 82–100)
MONOCYTES NFR BLD AUTO: 0.4 K/UL (ref 0.1–1.3)
MONOCYTES NFR BLD AUTO: 2.7 % (ref 2–12)
NEUTROPHILS # BLD AUTO: 14.6 K/UL (ref 1.8–8.9)
NEUTROPHILS NFR BLD AUTO: 91.6 % (ref 43–81)
PCO2 TEMP ADJ BLDA: 29.5 MMHG (ref 35–45)
PH TEMP ADJ BLDA: 7.44 [PH] (ref 7.35–7.45)
PHOSPHATE SERPL-MCNC: 1.3 MG/DL (ref 2.5–4.9)
PLATELET # BLD AUTO: 197 K/UL (ref 150–450)
PO2 TEMP ADJ BLDA: 90.7 MMHG (ref 75–100)
POTASSIUM SERPL-SCNC: 3 MMOL/L (ref 3.5–5.1)
RBC # BLD AUTO: 2.8 MIL/UL (ref 4–5.2)
SAO2 % BLDA: 96.7 % (ref 92–98.5)
SODIUM SERPL-SCNC: 140 MMOL/L (ref 136–145)
VENTILATION MODE VENT: (no result)
WBC NRBC COR # BLD AUTO: 15.9 K/UL (ref 4.3–11)

## 2022-12-25 PROCEDURE — 5A1D70Z PERFORMANCE OF URINARY FILTRATION, INTERMITTENT, LESS THAN 6 HOURS PER DAY: ICD-10-PCS

## 2022-12-25 RX ADMIN — SODIUM CHLORIDE SCH MG: 9 INJECTION, SOLUTION INTRAVENOUS at 08:27

## 2022-12-25 RX ADMIN — PIPERACILLIN SODIUM AND TAZOBACTAM SODIUM SCH MLS/HR: .25; 2 INJECTION, POWDER, LYOPHILIZED, FOR SOLUTION INTRAVENOUS at 08:27

## 2022-12-25 RX ADMIN — SODIUM CHLORIDE SCH MG: 9 INJECTION, SOLUTION INTRAVENOUS at 20:28

## 2022-12-25 RX ADMIN — SODIUM CHLORIDE PRN MLS/HR: 9 INJECTION, SOLUTION INTRAVENOUS at 11:20

## 2022-12-25 RX ADMIN — PIPERACILLIN SODIUM AND TAZOBACTAM SODIUM SCH MLS/HR: .25; 2 INJECTION, POWDER, LYOPHILIZED, FOR SOLUTION INTRAVENOUS at 15:37

## 2022-12-25 NOTE — NUR
ICU RN NOTE:



DR. DAVALOS FOLLOWED UP REGARDING POSSIBLE REPLACEMENT FOR POTASSIUM, PHOSPHORUS AND MAG. 
STILL WAITING FOR ORDERS. PHARMACY NOTIFIED.

## 2022-12-25 NOTE — NUR
ICU RN NOTE:



HEMODIALYSIS (HD) STARTED AT 1325. AT 1345 HD NURSE NOTICED BLOOD CLOT IN HD TUBING. HD 
STOPPED AND DR. DAVALOS NOTIFIED. NO HD OUTPUT NOTED. DR. DAVALOS ALSO NOTIFIED OF LOW 
POTASSIUM, PHOSPHOROUS AND MAGNESIUM. NO ORDERS YET.

## 2022-12-25 NOTE — NUR
ICU RN NOTE

PT IN BED SEDATED. ON VENT/ETT INTACT AND PATENT.PT TOLERATING THE SETTINGS WELL.  NO 
DISTRESS OR DISCOMFORT NOTED. NO S/S OF PAIN NOTED. ON TELE MONITOR SR HR 85. LT NARE WITH 
NGT FEEDING GLUCERNA AT 10 ML/HR, 0 ML RESIDUAL NOTED. BILATERAL SOFT WRIST RESTRAINTS ON. 
IVF NS INFUSING AT 75 ML/HR AND ALSO LEVOPHED INFUSING AT 0.08 MCG/KG/MIN NO S/S OF 
INFILTRATION NOTED AT KIRSTIN MIDLINE. F/C INTACT AND PATENT DRAINING YELLOWISH COLOR URINE. 
REPOSITION HER FOR SKIN MANAGEMENT AND COMFORT. KEPT HER DRY AND CLEAN. ALL NEEDS ATTENDED. 
VSS. CONTINUE TO MONITOR HER.

## 2022-12-25 NOTE — NUR
ICU RN CLOSING NOTES



PATIENT REMAINS IN BED SEDATED. ON MECHANICAL VENTILATOR TOLERATING SETTINGS WELL, SR ON 
BEDSIDE TELEMETRY MONITOR. WITH NG TUBE ATTACHED TO SUCTION. FUNES CATHETER WITH 30 CC 
OUTPUT. IV ACCESS ON RIGHT UPPER ARM MIDLINE FLUSHING EASILY WITHOUT RESISTANCE RUNNING NS 
AT 75 ML/HR AND LEVOPHED AT 0.1 MCG/MIN AND R HAND # 20G RUNNING DIPRIVAN AT 5 MCG/MIN. ALL 
DUE MEDS GIVEN, KEPT DRY AND CLEAN, WITH B SOFT WRIST RESTRAINTS, SAFETY MEASURES 
IMPLEMENTED, WILL ENDORSE TO AM SHIFT NURSE FOR CONTINUITY OF .

## 2022-12-25 NOTE — NUR
ICU RN OPENING NOTE:



RECEIVED PT. IN BED, INTUBATED AND SEDATED. MOVES EYELIDS WITH PAINFUL STIMULI. ETT - 
7.5/23; AC - 16; VT - 450; FIO2 - 40%; PEEP - 5.  NO S/S OF RESPIRATORY DISTRESS. CARDIAC 
MONITOR READS NSR WITH HR OF 75 BPM AT THIS TIME. MULTIPLE SKIN ISSUES NOTED, WILL DO WOUND 
TREATMENT AS ORDERED. HAS NGT IN THE L NARE, ON LOW INTERMITTENT SUCTION, CLEAR GASTRIC 
RESIDUAL NOTED IN CONTAINER. ON FUNES CATHETER, WITH YELLOW CLOUDY URINE WITH SEDIMENT 
NOTED. IV ACCESS ON KIRSTIN MIDLINE, WITH NS RUNNING AT 75 ML/HR AND LEVOPHED RUNNING AT 0.1 
MCG/KG/MIN; R WRIST #20G WITH DIPRIVAN RUNNING AT 5 MCG/KG/MIN. IV SITE DRESSINGS C/D/I WITH 
NO S/S OF INFILTRATION. PT. HAS NOELLE A/V SHUNT, THRILL AND BRUIT PRESENT. PT. ON SOFT 
BILATERAL WRIST RESTRAINTS, PALPABLE PULSES NOTED AND CAP REFILL < 3 SECS ON ALL 
EXTREMITIES. SAFETY MEASURES IN PLACE: BED IN LOWEST AND LOCKED POSITION, HOB ELEVATED AT 30 
DEGREES, BED ALARM ON, CALL LIGHT WITHIN REACH, SIDE RAILS UP X2. WILL TURN AND REPOSITION 
IN BED AT LEAST Q2H. WILL CONTINUE TO MONITOR PT. FOR ANY CHANGES.

## 2022-12-25 NOTE — NUR
Venous pressure increased, Blood clots noted to both arterial and venous lines, unable to 
return blood due to clots, Alred at bedside and showed clots, Zainab BERNAL at bedside and 
updated. Dr Berry made aware. HD treatment terminated, 15 mins on the machine only. VSS.

## 2022-12-25 NOTE — NUR
ICU RN CLOSING NOTE:



PT. REMAINS IN BED, INTUBATED, OFF SEDATION, OBTUNDED. OPENS EYES WITH PAINFUL STIMULI. ETT 
- 7.5/23; AC - 16; VT - 450; FIO2 - 40%; PEEP - 5. NO S/S OF RESPIRATORY DISTRESS. CARDIAC 
MONITOR READS NSR WITH HR OF 78 BPM AT THIS TIME. WOUND TREATMENT AND SKIN PRECAUTIONS DONE. 
NGT IN THE L NARE, WITH GLUCERNA RUNNING AT 10 ML/HR, NO GASTRIC RESIDUAL NOTED. FUNES 
CATHETER DRAINED 100 ML YELLOW CLOUDY URINE WITH SEDIMENT THIS SHIFT. IV ACCESS ON KIRSTIN 
MIDLINE, WITH NS RUNNING AT 75 ML/HR AND LEVOPHED RUNNING AT 0.08 MCG/KG/MIN; R WRIST #20G 
PATENT AND SALINE LOCKED. IV SITE DRESSINGS C/D/I WITH NO S/S OF INFILTRATION. PT. HAS NOELLE 
A/V SHUNT, HEMATOMA NOTED. PT. ON SOFT BILATERAL WRIST RESTRAINTS, PALPABLE PULSES NOTED AND 
CAP REFILL < 3 SECS ON ALL EXTREMITIES. SAFETY MEASURES MAINTAINED: BED IN LOWEST AND LOCKED 
POSITION, HOB ELEVATED AT 30 DEGREES, BED ALARM ON, CALL LIGHT WITHIN REACH, SIDE RAILS UP 
X2. TURNED AND REPOSITIONED IN BED AT LEAST Q2H. ENDORSED CONTINUITY OF CARE TO NIGHT SHIFT 
RN.

## 2022-12-26 VITALS — DIASTOLIC BLOOD PRESSURE: 43 MMHG | SYSTOLIC BLOOD PRESSURE: 100 MMHG

## 2022-12-26 VITALS — SYSTOLIC BLOOD PRESSURE: 126 MMHG | DIASTOLIC BLOOD PRESSURE: 43 MMHG

## 2022-12-26 VITALS — DIASTOLIC BLOOD PRESSURE: 50 MMHG | SYSTOLIC BLOOD PRESSURE: 149 MMHG

## 2022-12-26 VITALS — SYSTOLIC BLOOD PRESSURE: 142 MMHG | DIASTOLIC BLOOD PRESSURE: 54 MMHG

## 2022-12-26 VITALS — DIASTOLIC BLOOD PRESSURE: 42 MMHG | SYSTOLIC BLOOD PRESSURE: 111 MMHG

## 2022-12-26 VITALS — SYSTOLIC BLOOD PRESSURE: 123 MMHG | DIASTOLIC BLOOD PRESSURE: 47 MMHG

## 2022-12-26 VITALS — SYSTOLIC BLOOD PRESSURE: 130 MMHG | DIASTOLIC BLOOD PRESSURE: 38 MMHG

## 2022-12-26 VITALS — SYSTOLIC BLOOD PRESSURE: 128 MMHG | DIASTOLIC BLOOD PRESSURE: 54 MMHG

## 2022-12-26 VITALS — SYSTOLIC BLOOD PRESSURE: 109 MMHG | DIASTOLIC BLOOD PRESSURE: 76 MMHG

## 2022-12-26 VITALS — SYSTOLIC BLOOD PRESSURE: 140 MMHG | DIASTOLIC BLOOD PRESSURE: 61 MMHG

## 2022-12-26 VITALS — DIASTOLIC BLOOD PRESSURE: 65 MMHG | SYSTOLIC BLOOD PRESSURE: 133 MMHG

## 2022-12-26 VITALS — DIASTOLIC BLOOD PRESSURE: 74 MMHG | SYSTOLIC BLOOD PRESSURE: 120 MMHG

## 2022-12-26 VITALS — DIASTOLIC BLOOD PRESSURE: 58 MMHG | SYSTOLIC BLOOD PRESSURE: 123 MMHG

## 2022-12-26 VITALS — SYSTOLIC BLOOD PRESSURE: 131 MMHG | DIASTOLIC BLOOD PRESSURE: 61 MMHG

## 2022-12-26 VITALS — SYSTOLIC BLOOD PRESSURE: 137 MMHG | DIASTOLIC BLOOD PRESSURE: 42 MMHG

## 2022-12-26 VITALS — SYSTOLIC BLOOD PRESSURE: 111 MMHG | DIASTOLIC BLOOD PRESSURE: 69 MMHG

## 2022-12-26 VITALS — SYSTOLIC BLOOD PRESSURE: 136 MMHG | DIASTOLIC BLOOD PRESSURE: 55 MMHG

## 2022-12-26 VITALS — SYSTOLIC BLOOD PRESSURE: 94 MMHG | DIASTOLIC BLOOD PRESSURE: 49 MMHG

## 2022-12-26 VITALS — SYSTOLIC BLOOD PRESSURE: 98 MMHG | DIASTOLIC BLOOD PRESSURE: 59 MMHG

## 2022-12-26 VITALS — SYSTOLIC BLOOD PRESSURE: 132 MMHG | DIASTOLIC BLOOD PRESSURE: 55 MMHG

## 2022-12-26 VITALS — SYSTOLIC BLOOD PRESSURE: 124 MMHG | DIASTOLIC BLOOD PRESSURE: 44 MMHG

## 2022-12-26 VITALS — DIASTOLIC BLOOD PRESSURE: 61 MMHG | SYSTOLIC BLOOD PRESSURE: 137 MMHG

## 2022-12-26 VITALS — DIASTOLIC BLOOD PRESSURE: 48 MMHG | SYSTOLIC BLOOD PRESSURE: 114 MMHG

## 2022-12-26 VITALS — DIASTOLIC BLOOD PRESSURE: 51 MMHG | SYSTOLIC BLOOD PRESSURE: 117 MMHG

## 2022-12-26 VITALS — DIASTOLIC BLOOD PRESSURE: 50 MMHG | SYSTOLIC BLOOD PRESSURE: 144 MMHG

## 2022-12-26 VITALS — DIASTOLIC BLOOD PRESSURE: 65 MMHG | SYSTOLIC BLOOD PRESSURE: 129 MMHG

## 2022-12-26 VITALS — DIASTOLIC BLOOD PRESSURE: 55 MMHG | SYSTOLIC BLOOD PRESSURE: 136 MMHG

## 2022-12-26 VITALS — SYSTOLIC BLOOD PRESSURE: 120 MMHG | DIASTOLIC BLOOD PRESSURE: 38 MMHG

## 2022-12-26 VITALS — SYSTOLIC BLOOD PRESSURE: 128 MMHG | DIASTOLIC BLOOD PRESSURE: 58 MMHG

## 2022-12-26 VITALS — DIASTOLIC BLOOD PRESSURE: 56 MMHG | SYSTOLIC BLOOD PRESSURE: 169 MMHG

## 2022-12-26 VITALS — SYSTOLIC BLOOD PRESSURE: 121 MMHG | DIASTOLIC BLOOD PRESSURE: 37 MMHG

## 2022-12-26 LAB
BASOPHILS # BLD AUTO: 0 K/UL (ref 0–0.2)
BASOPHILS NFR BLD AUTO: 0.1 % (ref 0–2)
BUN SERPL-MCNC: 43 MG/DL (ref 7–18)
CALCIUM SERPL-MCNC: 7.7 MG/DL (ref 8.5–10.1)
CHLORIDE SERPL-SCNC: 110 MMOL/L (ref 98–107)
CO2 SERPL-SCNC: 23 MMOL/L (ref 21–32)
CREAT SERPL-MCNC: 2.5 MG/DL (ref 0.6–1.3)
EOSINOPHIL NFR BLD AUTO: 0.6 % (ref 0–6)
GLUCOSE SERPL-MCNC: 213 MG/DL (ref 74–106)
HCT VFR BLD AUTO: 23 % (ref 33–45)
HGB BLD-MCNC: 7.1 G/DL (ref 11.5–14.8)
LYMPHOCYTES NFR BLD AUTO: 1 K/UL (ref 0.8–4.8)
LYMPHOCYTES NFR BLD AUTO: 8.1 % (ref 20–44)
MCHC RBC AUTO-ENTMCNC: 32 G/DL (ref 31–36)
MCV RBC AUTO: 91 FL (ref 82–100)
MONOCYTES NFR BLD AUTO: 0.3 K/UL (ref 0.1–1.3)
MONOCYTES NFR BLD AUTO: 2.6 % (ref 2–12)
NEUTROPHILS # BLD AUTO: 11.4 K/UL (ref 1.8–8.9)
NEUTROPHILS NFR BLD AUTO: 88.6 % (ref 43–81)
PLATELET # BLD AUTO: 165 K/UL (ref 150–450)
POTASSIUM SERPL-SCNC: 2.7 MMOL/L (ref 3.5–5.1)
RBC # BLD AUTO: 2.48 MIL/UL (ref 4–5.2)
SODIUM SERPL-SCNC: 144 MMOL/L (ref 136–145)
WBC NRBC COR # BLD AUTO: 12.8 K/UL (ref 4.3–11)

## 2022-12-26 RX ADMIN — SODIUM CHLORIDE PRN MLS/HR: 9 INJECTION, SOLUTION INTRAVENOUS at 02:49

## 2022-12-26 RX ADMIN — SODIUM CHLORIDE PRN MLS/HR: 9 INJECTION, SOLUTION INTRAVENOUS at 03:29

## 2022-12-26 RX ADMIN — SODIUM CHLORIDE SCH MG: 9 INJECTION, SOLUTION INTRAVENOUS at 09:22

## 2022-12-26 NOTE — NUR
RN OPENING NOTES:



RECEIVED PATIENT AWAKE IN BED, OPENS EYES, ACCOMPANIED, BY DAUGHTER, PLACED COMFORTABLY ON 
BED, HOB AT 30 DEGREE, BED IN LOW POSITION  CALL LIGHTS WITHIN REACH, NO COMPLAIN OF PAIN 
AND DISCOMFORT AT THIS TIME, NO FACIAL GRIMACING WAS OBSERVED, NO SHORTNESS OF BREATH,  ON 
O2 INHALATION AT 2LPM SATURATING WELL AT 94% PATIENT WAS TRANSFER FROM ICU ON COMFORT 
MEASURE ONLY, V/S ARE WITHIN NORMAL RANGE, KEPT CLEAN AND DRY ON CLOSE  MONITORING,

## 2022-12-26 NOTE — NUR
ICU RN TRANSFER NOTE:



PT. TRANSFERRED TO [facility] ROOM #320-2 VIA HOSPITAL BED. REPORT GIVEN TO [facility] SONG STRAUSS AT 
BEDSIDE. PT. ON COMFORT MEASURES ONLY. PT. REMAINS OBTUNDED, OPENS EYES TO TACTILE AND 
PAINFUL STIMULI. UNABLE TO FOLLOW COMMANDS. ON O2 VIA 2L/MIN NASAL CANNULA. NO S/S OF 
RESPIRATORY DISTRESS AT THIS TIME. WOUND TREATMENT DONE. ON FUNES CATHETER, DRAINED 225 ML 
YELLOW CLOUDY URINE WITH SEDIMENT THIS SHIFT. IV ACCESS ON KIRSTIN MIDLINE AND R WRIST #20G, 
BOTH PATENT AND SALINE LOCKED. IV SITE DRESSINGS C/D/I WITH NO S/S OF INFILTRATION. PT. HAS 
NOELLE A/V SHUNT, FAINT THRILL AND BRUIT PRESENT. SAFETY MEASURES MAINTAINED: BED IN LOWEST AND 
LOCKED POSITION, HOB ELEVATED AT 30 DEGREES, BED ALARM ON, CALL LIGHT WITHIN REACH, SIDE 
RAILS UP X2. TURNED AND REPOSITIONED IN BED AT LEAST Q2H. PT. CHART HANDED OVER AT BEDSIDE. 
PT. HAS NO BELONGINGS. ENDORSED CONTINUITY OF CARE TO [facility] SONG STRAUSS.

## 2022-12-26 NOTE — NUR
ICU RN OPENING NOTE:



RECEIVED PT. IN BED, INTUBATED. OPENS EYES TO TACTILE AND PAINFUL STIMULI. UNABLE TO FOLLOW 
COMMANDS. ETT - 7.5/23; SIMV - 4; VT - 450; FIO2 - 30%; PEEP - 5; PS - 15.  NO S/S OF 
RESPIRATORY DISTRESS. CARDIAC MONITOR READS NSR WITH HR OF 63 BPM AT THIS TIME. MULTIPLE 
SKIN ISSUES NOTED, WILL DO WOUND TREATMENT AS ORDERED. HAS NGT IN THE L NARE WITH GLUCERNA 
RUNNING AT 10 ML/HR, NO GASTRIC RESIDUAL NOTED. ON FUNES CATHETER, WITH YELLOW CLOUDY URINE 
WITH SEDIMENT NOTED. IV ACCESS ON KIRSTIN MIDLINE, WITH NS RUNNING AT 75 ML/HR AND LEVOPHED 
RUNNING AT 0.06 MCG/KG/MIN; R WRIST #20G PATENT AND SALINE LOCKED. IV SITE DRESSINGS C/D/I 
WITH NO S/S OF INFILTRATION. PT. HAS NOELLE A/V SHUNT, FAINT THRILL AND BRUIT PRESENT. PT. ON 
SOFT BILATERAL WRIST RESTRAINTS, PALPABLE PULSES NOTED AND CAP REFILL < 3 SECS ON ALL 
EXTREMITIES. SAFETY MEASURES IN PLACE: BED IN LOWEST AND LOCKED POSITION, HOB ELEVATED AT 30 
DEGREES, BED ALARM ON, CALL LIGHT WITHIN REACH, SIDE RAILS UP X2. WILL TURN AND REPOSITION 
IN BED AT LEAST Q2H. WILL CONTINUE TO MONITOR PT. FOR ANY CHANGES.

## 2022-12-26 NOTE — NUR
ICU RN NOTE:



PT.'S FAMILY SPOKE WITH DR. CAGE AND DECIDED TO PUT PT. ON COMFORT MEASURES ONLY. PT. 
EXTUBATED AND WAS PUT ON O2 VIA 2L/MIN NASAL CANNULA. VS STABLE AT THIS TIME. DISCONTINUED 
TUBE FEEDING, NGT REMOVED, AND ALL MEDS DISCONTINUED. WILL CONTINUE TO MONITOR PT. FOR ANY 
CHANGES AND WILL KEEP PT. AS COMFORTABLE AS POSSIBLE. FAMILY AT BEDSIDE.

## 2022-12-26 NOTE — NUR
ICU RN

 DNR CODE. VENT. SETTING REMAINS THE SAME. PT IS LEVOPHED DRIP /TITRATED/. PT IS ANURIC. 
NEEDS HEMODIALYSIS. VSS. AFEBRILE, SCOPE-SR. MEDICATED AS ORDERED. WILL CONTINUE CLOSE 
MONITORING.

## 2022-12-26 NOTE — NUR
RT

PER DR CAGE PATIENT WAS WEANED AND EXTUBATED. PLACED ON 2L NC FOR COMFORT MEASURES. FAMILY 
AT BEDSIDE. 

-------------------------------------------------------------------------------

Addendum: 12/26/22 at 1210 by CELSA MARINO RT

-------------------------------------------------------------------------------

Amended: Links added.

## 2022-12-27 VITALS — DIASTOLIC BLOOD PRESSURE: 71 MMHG | SYSTOLIC BLOOD PRESSURE: 140 MMHG

## 2022-12-27 VITALS — SYSTOLIC BLOOD PRESSURE: 139 MMHG | DIASTOLIC BLOOD PRESSURE: 75 MMHG

## 2022-12-27 VITALS — DIASTOLIC BLOOD PRESSURE: 71 MMHG | SYSTOLIC BLOOD PRESSURE: 154 MMHG

## 2022-12-27 NOTE — NUR
MS RN CLOSING NOTES:



PATIENT AWAKE IN BED, OPENS EYES, BED IN LOW POSITION CALL LIGHTS WITHIN REACH, NO COMPLAIN 
OF PAIN AND DISCOMFORT AT THIS TIME, NO FACIAL GRIMACING WAS OBSERVED, ON O2 INHALATION AT 
2LPM SATURATING WELL, PATIENT ON COMFORT MEASURES REPOSITION, KEPT CLEAN AND DRY ALL NEEDS 
MET ENDORSE TO INCOMING SHIFT.

## 2022-12-27 NOTE — NUR
RN CLOSING NOTES:



PATIENT AWAKE IN BED, OPENS EYES WITH TOUCH. BED IN LOW POSITION CALL LIGHTS WITHIN REACH, 
NO COMPLAIN OF PAIN AND DISCOMFORT DURING THE SHIFT, NO FACIAL GRIMACING WAS OBSERVED, ON O2 
INHALATION AT 2LPM SATURATING WELL, PATIENT ON COMFORT MEASURES REPOSITION, KEPT CLEAN AND 
DRY ALL NEEDS MET ENDORSE TO THE NIGHT SHIFT NURSE.

## 2022-12-27 NOTE — NUR
MS RN OPENING NOTE



RECEIVED PATIENT IN BED, WITH HOB ELEVATED, WITH EYES CLOSED, FAMILY AT BEDSIDE. AFEBRILE 
AND NOT IN ANY FORM OF ACUTE DISTRESS. ON O2 INHALATION VIA NASAL CANNULA AT 2LPM. WITH IV 
ACCESS ON KIRSTIN MIDLINE-SL. WITH FUNES CATHETER, INTACT AND DRAINING WELL WITH YELLOW URINE 
OUTPUT, NO HEMATURIA OR SEDIMENTS NOTED. PATIENT IS ON COMFORT CARE. SAFETY MEASURES IN 
PLACE. KEPT BED IN LOCKED AND IN LOW POSITION. SIDE RAILS UP X2. CALL LIGHT WITHIN EASY 
REACH.

## 2022-12-28 VITALS — SYSTOLIC BLOOD PRESSURE: 118 MMHG | DIASTOLIC BLOOD PRESSURE: 46 MMHG

## 2022-12-28 VITALS — DIASTOLIC BLOOD PRESSURE: 29 MMHG | SYSTOLIC BLOOD PRESSURE: 87 MMHG

## 2022-12-28 VITALS — DIASTOLIC BLOOD PRESSURE: 73 MMHG | SYSTOLIC BLOOD PRESSURE: 111 MMHG

## 2022-12-28 NOTE — NUR
MS RN CLOSING NOTE



PATIENT IN BED, WITH HOB ELEVATED, WITH EYES CLOSED, RESPONDS TO TACTILE STIMULI. AFEBRILE 
AND NOT IN ANY FORM OF ACUTE DISTRESS. ON O2 INHALATION VIA NASAL CANNULA AT 2LPM. WITH IV 
ACCESS ON KIRSTIN MIDLINE-SL. WITH FUNES CATHETER, INTACT AND DRAINING WELL WITH YELLOW URINE 
OUTPUT, NO HEMATURIA OR SEDIMENTS NOTED. MAINTAINED ON COMFORT CARE. TURNED AND REPOSITIONED 
EVERY 2 HOURS TO PROMOTE PROPER CIRCULATION AND COMFORT. SAFETY MEASURES IN PLACE. KEPT BED 
IN LOCKED AND IN LOW POSITION. SIDE RAILS UP X2. CALL LIGHT WITHIN EASY REACH. ALL NURSING 
NEEDS ATTENDED. ENDORSED TO INCOMING SHIFT FOR CONTINUITY OF CARE.

## 2022-12-28 NOTE — NUR
MS RN OPENING NOTE



Received patient from night shift nurse; Patient in bed, eyes closed, on nasal cannula at 
2lpm and with episodes of shallow breathing; With IV access on KIRSTIN midline - SL, intact; 
With pak catheter draining with maite colored urine; Comfort care measures done; Monitored 
vital signs accurately; Safety measures done; bed in low position, locked, side rails x 3, 
call light within reach; Will continue to monitor throughout shift

## 2022-12-28 NOTE — NUR
MS RN CLOSING NOTE



Patient in bed, eyes closed, on nasal cannula at 2lpm and with episodes of shallow 
breathing; With IV access on KIRSTIN midline - SL, intact; With pak catheter draining with 
maite colored urine; Comfort care measures done; Monitored vital signs accurately; Safety 
measures done; bed in low position, locked, side rails x 3, call light within reach; Latest 
vital signs as follows; /73,  , RR 22,

O2 sat 98; Will endorse to night shift nurse for continuity of care.

## 2022-12-28 NOTE — NUR
MS RN OPENING NOTE



RECEIVED PATIENT IN BED, WITH HOB ELEVATED, EYES CLOSED, WITH FAMILY AT BEDSIDE. AFEBRILE 
AND NOTED WITH SHALLOW BREATHING. ON O2 INHALATION VIA NASAL CANNULA AT 2LPM. WITH IV ACCESS 
ON KIRSTIN MIDLINE-SL. WITH FUNES CATHETER, INTACT AND DRAINING WELL WITH YELLOW URINE OUTPUT, 
NO HEMATURIA OR SEDIMENTS NOTED AT THIS TIME. PATIENT CONTINUOUS ON COMFORT CARE. SAFETY 
MEASURES IN PLACE. KEPT BED IN LOCKED AND IN LOW POSITION. SIDE RAILS UP X2. CALL LIGHT 
WITHIN EASY REACH.

## 2022-12-29 VITALS — DIASTOLIC BLOOD PRESSURE: 48 MMHG | SYSTOLIC BLOOD PRESSURE: 139 MMHG

## 2022-12-29 VITALS — SYSTOLIC BLOOD PRESSURE: 133 MMHG | DIASTOLIC BLOOD PRESSURE: 88 MMHG

## 2022-12-29 VITALS — DIASTOLIC BLOOD PRESSURE: 34 MMHG | SYSTOLIC BLOOD PRESSURE: 67 MMHG

## 2022-12-29 RX ADMIN — Medication PRN MG: at 16:59

## 2022-12-29 RX ADMIN — Medication PRN MG: at 22:46

## 2022-12-29 RX ADMIN — Medication PRN MG: at 20:32

## 2022-12-29 NOTE — NUR
MS RN CLOSING NOTE



PATIENT IN BED, WITH HOB ELEVATED, EYES CLOSED, WITH DAUGHTER AT BEDSIDE. AFEBRILE AND NOTED 
WITH SHALLOW BREATHING. ON O2 INHALATION VIA NASAL CANNULA AT 2LPM. WITH IV ACCESS ON KIRSTIN 
MIDLINE-SL. WITH FUNES CATHETER, INTACT AND NOTED WITH MINIMAL URINE OUTPUT. MAINTAINED ON 
COMFORT CARE. SAFETY MEASURES IN PLACE. KEPT BED IN LOCKED AND IN LOW POSITION. SIDE RAILS 
UP X2. CALL LIGHT WITHIN EASY REACH. ALL NURSING NEEDS ATTENDED. ENDORSED TO INCOMING SHIFT 
FOR CONTINUITY OF CARE.

## 2022-12-29 NOTE — NUR
MS RN OPENING NOTES



RECEIVED PATIENT ASLEEP IN BED, DIFFICULT TO AROUSE. FAMILY ON BEDSIDE. BREATHING EVEN AND 
NON-LABORED, ON O2 AT 5LPM VIA [employer] CANULA. NOT IN APPARENT DISTRESS. NO S/S OF PAIN OR 
DISCOMFORT AT THIS TIME. HAS RIGHT UPPER ARM MIDLINE #18G AND RIGHT HAND IV ACCESS #20G, 
BOTH SALINE LOCKED. NO S/S OF INFILTRATION NOTED. HAS LEFT UPPER ARM AV FISTULA. HAS 
INDWELLING FUNES CATHETER DRAINING ALISIA URINE TO BAG BY GRAVITY. MULTIPLE SKIN 
DISCOLORATION AND BILATERAL UPPER EXTREMITIES EDEMA NOTED. SAFETY PRECAUTIONS IN PLACE: BED 
LOCKED AND IN LOW POSITION, SIDE RAILS UP X3, CALL LIGHT WITHIN REACH. WILL CONTINUE POC.

## 2022-12-29 NOTE — NUR
MS RN OPENING NOTE



Received patient from night shift nurse; Patient in bed, eyes closed, on nasal cannula at 
2lpm and with presence of shallow breathing; pale colored; With IV access on KIRSTIN midline - 
SL, intact; With pak catheter draining with maite colored urine; Monitored vital signs 
accurately; Safety measures done; bed in low position, locked, side rails x 3, call light 
within reach; Will continue to monitor throughout shift

## 2022-12-29 NOTE — NUR
MS RN NOTES



ADMINISTERED PRN MORPHINE ACCORDING TO PLAN OF CARE. FAMILY VERBALIZED UNDERSTANDING.

## 2022-12-29 NOTE — NUR
MS RN CLOSING NOTE



Patient in bed, eyes closed, on nasal cannula at 2lpm and with presence of shallow breathing 
and grunting; pale colored; With IV access on KIRSTIN midline - SL, intact; With pak catheter 
draining with maite colored urine; Monitored vital signs accurately; Repositioned from time 
to time; Safety measures done; bed in low position, locked, side rails x 3, call light 
within reach; Will endorse to night shift nurse for continuity of care

## 2022-12-30 RX ADMIN — Medication PRN MG: at 09:49

## 2022-12-30 RX ADMIN — Medication PRN MG: at 01:55

## 2022-12-30 RX ADMIN — Medication PRN MG: at 06:36

## 2022-12-30 RX ADMIN — Medication PRN MG: at 04:06

## 2022-12-30 RX ADMIN — Medication PRN MG: at 13:22

## 2022-12-30 NOTE — NUR
RN NOTE

CALLED [facility] AND SPOKE WITH [facility]. INFORMATION OF THE DEATH OF THE PATIENT PROVIDED. 
REFERRAL #: [id number].

## 2022-12-30 NOTE — NUR
RN NOTE

REMOVED IV LINES, FUNES CATHETER AND O2 CANNULA. POST-MORTEM CARE DONE. PLACED BODY ON 
POST-MORTEM BAG WITH IDENTIFICATION TAGS ON THE LEFT TOE, LEFT WRIST AND OUTER PART OF THE 
BAG. AWAITING FOR PICK-UP BY MORTUARY.

## 2022-12-30 NOTE — NUR
RN NOTE

CHECKED PATIENT WITH FAMILY AT THE BEDSIDE. PATIENT IS PALE AND BREATHING IS ALREADY VERY 
SHALLOW AT 5CPM/MIN. PATIENT ON O2 AT 3LPM. PROVIDED FAMILY TIME WITH THE PATIENT.

## 2022-12-30 NOTE — NUR
MS RN OPENING NOTE



Received patient from night shift nurse; Patient in bed, eyes closed, on nasal cannula at 
3lpm and with presence of shallow breathing; pale colored; With IV access on KIRSTIN midline - 
SL, intact; With pak catheter draining with yellow colored urine; Safety measures done; 
bed in low position, locked, side rails x 3, call light within reach; Will continue to 
monitor throughout shift

## 2022-12-30 NOTE — NUR
MS RN CLOSING NOTES



PATIENT IN BED ASLEEP, DAUGHTER ON BEDSIDE. NOT IN CARDIAC OR RESPIRATORY DISTRESS. ON O2 AT 
3LPM VIA NASAL CANULA. AFEBRILE. STRONG CAROTID PULSE NOTED. RIGHT UPPER ARM MIDLINE #18G 
INTACT, PATENT AND FLUSHING. OFFLOADED AND REPOSITIONED. ALL DUE MEDS GIVEN AND NEEDS 
ATTENDED. SAFETY PRECAUTIONS MAINTAINED. WILL ENDORSE TO NEXT SHIFT FOR BRIAN.

## 2022-12-30 NOTE — NUR
RN NOTE

[facility] AND WAS ABLE TO SPEAK TO NICHOLE. INFORMED ABOUT DEATH OF THE 
PATIENT AND STATED THEY WILL BE HEADING TO PICK-UP THE BODY OF THE PATIENT.

## 2022-12-30 NOTE — NUR
RN NOTE

CHECKED PATIENT'S STATUS. NO PULSE AND NO BREATHING NOTED. FAMILY INFORMED AND AWARE AT 
BEDSIDE. FAMILY PROVIDED CONTACT NUMBER FOR MORTU[facility] THEY HAVE ARRANG[phone] WILL CALL THE MORTUARY.

## 2022-12-30 NOTE — NUR
RN NOTE

MORTUARY PERSONNEL FROM [facility] PICKED UP PATIENT'S BODY AND LEFT WITH 
PATIENT'S SON DUY.